# Patient Record
Sex: FEMALE | Race: OTHER | HISPANIC OR LATINO | URBAN - METROPOLITAN AREA
[De-identification: names, ages, dates, MRNs, and addresses within clinical notes are randomized per-mention and may not be internally consistent; named-entity substitution may affect disease eponyms.]

---

## 2017-07-11 ENCOUNTER — EMERGENCY (EMERGENCY)
Facility: HOSPITAL | Age: 27
LOS: 1 days | Discharge: HOME | End: 2017-07-11
Attending: EMERGENCY MEDICINE | Admitting: EMERGENCY MEDICINE
Payer: MEDICAID

## 2017-07-11 VITALS
TEMPERATURE: 98 F | OXYGEN SATURATION: 100 % | SYSTOLIC BLOOD PRESSURE: 98 MMHG | DIASTOLIC BLOOD PRESSURE: 60 MMHG | HEART RATE: 72 BPM | RESPIRATION RATE: 16 BRPM

## 2017-07-11 VITALS
SYSTOLIC BLOOD PRESSURE: 111 MMHG | TEMPERATURE: 98 F | OXYGEN SATURATION: 99 % | WEIGHT: 130.07 LBS | RESPIRATION RATE: 18 BRPM | HEART RATE: 90 BPM | HEIGHT: 64 IN | DIASTOLIC BLOOD PRESSURE: 72 MMHG

## 2017-07-11 LAB
ALBUMIN SERPL ELPH-MCNC: 4.6 G/DL — SIGNIFICANT CHANGE UP (ref 3.3–5)
ALP SERPL-CCNC: 60 U/L — SIGNIFICANT CHANGE UP (ref 40–120)
ALT FLD-CCNC: 30 U/L — SIGNIFICANT CHANGE UP (ref 4–33)
AMYLASE P1 CFR SERPL: 73 U/L — SIGNIFICANT CHANGE UP (ref 25–125)
APPEARANCE UR: CLEAR — SIGNIFICANT CHANGE UP
AST SERPL-CCNC: 18 U/L — SIGNIFICANT CHANGE UP (ref 4–32)
BASE EXCESS BLDV CALC-SCNC: 3 MMOL/L — SIGNIFICANT CHANGE UP
BASOPHILS # BLD AUTO: 0.02 K/UL — SIGNIFICANT CHANGE UP (ref 0–0.2)
BASOPHILS NFR BLD AUTO: 0.2 % — SIGNIFICANT CHANGE UP (ref 0–2)
BILIRUB SERPL-MCNC: < 0.2 MG/DL — LOW (ref 0.2–1.2)
BILIRUB UR-MCNC: NEGATIVE — SIGNIFICANT CHANGE UP
BLOOD GAS VENOUS - CREATININE: 0.64 MG/DL — SIGNIFICANT CHANGE UP (ref 0.5–1.3)
BLOOD UR QL VISUAL: HIGH
BUN SERPL-MCNC: 6 MG/DL — LOW (ref 7–23)
CALCIUM SERPL-MCNC: 9.2 MG/DL — SIGNIFICANT CHANGE UP (ref 8.4–10.5)
CHLORIDE BLDV-SCNC: 107 MMOL/L — SIGNIFICANT CHANGE UP (ref 96–108)
CHLORIDE SERPL-SCNC: 102 MMOL/L — SIGNIFICANT CHANGE UP (ref 98–107)
CO2 SERPL-SCNC: 25 MMOL/L — SIGNIFICANT CHANGE UP (ref 22–31)
COLOR SPEC: YELLOW — SIGNIFICANT CHANGE UP
CREAT SERPL-MCNC: 0.71 MG/DL — SIGNIFICANT CHANGE UP (ref 0.5–1.3)
EOSINOPHIL # BLD AUTO: 0.06 K/UL — SIGNIFICANT CHANGE UP (ref 0–0.5)
EOSINOPHIL NFR BLD AUTO: 0.7 % — SIGNIFICANT CHANGE UP (ref 0–6)
GAS PNL BLDV: 134 MMOL/L — LOW (ref 136–146)
GLUCOSE BLDV-MCNC: 86 — SIGNIFICANT CHANGE UP (ref 70–99)
GLUCOSE SERPL-MCNC: 87 MG/DL — SIGNIFICANT CHANGE UP (ref 70–99)
GLUCOSE UR-MCNC: NEGATIVE — SIGNIFICANT CHANGE UP
HCO3 BLDV-SCNC: 24 MMOL/L — SIGNIFICANT CHANGE UP (ref 20–27)
HCT VFR BLD CALC: 40.2 % — SIGNIFICANT CHANGE UP (ref 34.5–45)
HCT VFR BLDV CALC: 41 % — SIGNIFICANT CHANGE UP (ref 34.5–45)
HGB BLD-MCNC: 12.9 G/DL — SIGNIFICANT CHANGE UP (ref 11.5–15.5)
HGB BLDV-MCNC: 13.3 G/DL — SIGNIFICANT CHANGE UP (ref 11.5–15.5)
IMM GRANULOCYTES # BLD AUTO: 0.01 # — SIGNIFICANT CHANGE UP
IMM GRANULOCYTES NFR BLD AUTO: 0.1 % — SIGNIFICANT CHANGE UP (ref 0–1.5)
KETONES UR-MCNC: NEGATIVE — SIGNIFICANT CHANGE UP
LACTATE BLDV-MCNC: 1.6 MMOL/L — SIGNIFICANT CHANGE UP (ref 0.5–2)
LEUKOCYTE ESTERASE UR-ACNC: NEGATIVE — SIGNIFICANT CHANGE UP
LIDOCAIN IGE QN: 40.8 U/L — SIGNIFICANT CHANGE UP (ref 7–60)
LYMPHOCYTES # BLD AUTO: 1.74 K/UL — SIGNIFICANT CHANGE UP (ref 1–3.3)
LYMPHOCYTES # BLD AUTO: 21.1 % — SIGNIFICANT CHANGE UP (ref 13–44)
MCHC RBC-ENTMCNC: 25.3 PG — LOW (ref 27–34)
MCHC RBC-ENTMCNC: 32.1 % — SIGNIFICANT CHANGE UP (ref 32–36)
MCV RBC AUTO: 78.8 FL — LOW (ref 80–100)
MONOCYTES # BLD AUTO: 0.72 K/UL — SIGNIFICANT CHANGE UP (ref 0–0.9)
MONOCYTES NFR BLD AUTO: 8.7 % — SIGNIFICANT CHANGE UP (ref 2–14)
MUCOUS THREADS # UR AUTO: SIGNIFICANT CHANGE UP
NEUTROPHILS # BLD AUTO: 5.68 K/UL — SIGNIFICANT CHANGE UP (ref 1.8–7.4)
NEUTROPHILS NFR BLD AUTO: 69.2 % — SIGNIFICANT CHANGE UP (ref 43–77)
NITRITE UR-MCNC: NEGATIVE — SIGNIFICANT CHANGE UP
NON-SQ EPI CELLS # UR AUTO: <1 — SIGNIFICANT CHANGE UP
NRBC # FLD: 0 — SIGNIFICANT CHANGE UP
PCO2 BLDV: 57 MMHG — HIGH (ref 41–51)
PH BLDV: 7.32 PH — SIGNIFICANT CHANGE UP (ref 7.32–7.43)
PH UR: 6 — SIGNIFICANT CHANGE UP (ref 5–8)
PLATELET # BLD AUTO: 225 K/UL — SIGNIFICANT CHANGE UP (ref 150–400)
PMV BLD: 11.2 FL — SIGNIFICANT CHANGE UP (ref 7–13)
PO2 BLDV: < 24 MMHG — LOW (ref 35–40)
POTASSIUM BLDV-SCNC: 3.4 MMOL/L — SIGNIFICANT CHANGE UP (ref 3.4–4.5)
POTASSIUM SERPL-MCNC: 3.6 MMOL/L — SIGNIFICANT CHANGE UP (ref 3.5–5.3)
POTASSIUM SERPL-SCNC: 3.6 MMOL/L — SIGNIFICANT CHANGE UP (ref 3.5–5.3)
PROT SERPL-MCNC: 8.3 G/DL — SIGNIFICANT CHANGE UP (ref 6–8.3)
PROT UR-MCNC: 30 — HIGH
RBC # BLD: 5.1 M/UL — SIGNIFICANT CHANGE UP (ref 3.8–5.2)
RBC # FLD: 13.5 % — SIGNIFICANT CHANGE UP (ref 10.3–14.5)
RBC CASTS # UR COMP ASSIST: SIGNIFICANT CHANGE UP (ref 0–?)
SAO2 % BLDV: 16.6 % — LOW (ref 60–85)
SODIUM SERPL-SCNC: 142 MMOL/L — SIGNIFICANT CHANGE UP (ref 135–145)
SP GR SPEC: 1.01 — SIGNIFICANT CHANGE UP (ref 1–1.03)
SQUAMOUS # UR AUTO: SIGNIFICANT CHANGE UP
UROBILINOGEN FLD QL: 0.2 E.U. — SIGNIFICANT CHANGE UP (ref 0.2–1)
WBC # BLD: 8.23 K/UL — SIGNIFICANT CHANGE UP (ref 3.8–10.5)
WBC # FLD AUTO: 8.23 K/UL — SIGNIFICANT CHANGE UP (ref 3.8–10.5)
WBC UR QL: HIGH (ref 0–?)

## 2017-07-11 PROCEDURE — 99284 EMERGENCY DEPT VISIT MOD MDM: CPT

## 2017-07-11 PROCEDURE — 74177 CT ABD & PELVIS W/CONTRAST: CPT | Mod: 26

## 2017-07-11 RX ORDER — FAMOTIDINE 10 MG/ML
20 INJECTION INTRAVENOUS ONCE
Qty: 0 | Refills: 0 | Status: COMPLETED | OUTPATIENT
Start: 2017-07-11 | End: 2017-07-11

## 2017-07-11 RX ORDER — SODIUM CHLORIDE 9 MG/ML
1000 INJECTION INTRAMUSCULAR; INTRAVENOUS; SUBCUTANEOUS ONCE
Qty: 0 | Refills: 0 | Status: COMPLETED | OUTPATIENT
Start: 2017-07-11 | End: 2017-07-11

## 2017-07-11 RX ORDER — ONDANSETRON 8 MG/1
4 TABLET, FILM COATED ORAL ONCE
Qty: 0 | Refills: 0 | Status: COMPLETED | OUTPATIENT
Start: 2017-07-11 | End: 2017-07-11

## 2017-07-11 RX ADMIN — FAMOTIDINE 20 MILLIGRAM(S): 10 INJECTION INTRAVENOUS at 17:34

## 2017-07-11 RX ADMIN — ONDANSETRON 4 MILLIGRAM(S): 8 TABLET, FILM COATED ORAL at 17:55

## 2017-07-11 RX ADMIN — SODIUM CHLORIDE 1000 MILLILITER(S): 9 INJECTION INTRAMUSCULAR; INTRAVENOUS; SUBCUTANEOUS at 17:31

## 2017-07-11 NOTE — ED PROVIDER NOTE - OBJECTIVE STATEMENT
26 y/o female w/ hx of undiagnosed abdominal pain (years) p/w abdominal discomfort. Patient was having nausea / vomiting and diarrhea for 1 week with and lower abdominal pain. Described as cramping.  had similar symptoms. Subjective fever. Took imodium earlier today w/ resolution of symptoms. No urinary complaints. LMP 6/20/17    Follows GI DR NNEKA FLOOD 26 y/o female w/ hx of undiagnosed abdominal pain (years) p/w abdominal discomfort. Patient was having nausea / vomiting and diarrhea for 1 week with and lower abdominal pain. Described as cramping.  had similar symptoms. Subjective fever. Took imodium earlier today w/ resolution of symptoms. No urinary complaints. LMP 6/20/17  Pt with recent camping trip in woods and had been eating hamburgers out of a cooler and cooking on own.      Follows GI DR NNEKA FLOOD

## 2017-07-11 NOTE — ED ADULT TRIAGE NOTE - CHIEF COMPLAINT QUOTE
pt reports having colonscopy and endoscopy x 1 week ago with fevers and chills s/p procedure, pt reports she then developed stomach flu was treated by GI, given antibiotics and pantoprazol. pt reports she then went camping over the weekend and developed diarrhea every 15 minutes with worsening abdominal pain, and bloody stools now with abdominal cramping and nausea worse with laying down. pt also endorses lightheadedness and dehydration, decreased PO intake. pt reports taking imodium with minimal relief. PMHX: possible IBS, gastric ulcer

## 2017-07-11 NOTE — ED PROVIDER NOTE - PLAN OF CARE
pls rest, drink plenty of warm fluids, motrin 600mg every 8 hours, f/u with your obgyn, return for any worsening symptoms or any other concerning symptoms

## 2017-07-11 NOTE — ED PROVIDER NOTE - ATTENDING CONTRIBUTION TO CARE
I performed a face to face evaluation of this patient and performed a full history and physical examination on the patient.  I agree with the resident's history, physical examination, and plan of the patient.  Pt with abd pain, n/v/d, recent camping trip no fevers.  Has been occurring x days. Now with abd pain, mild diffuse ttp, no rebound.  No cvat, labs, ua, CT

## 2017-07-11 NOTE — ED PROVIDER NOTE - CARE PLAN
Principal Discharge DX:	Colitis Principal Discharge DX:	Ruptured ovarian cyst  Instructions for follow-up, activity and diet:	pls rest, drink plenty of warm fluids, motrin 600mg every 8 hours, f/u with your obgyn, return for any worsening symptoms or any other concerning symptoms

## 2017-07-11 NOTE — ED ADULT NURSE REASSESSMENT NOTE - NS ED NURSE REASSESS COMMENT FT1
Pt awake and alert afebrile co abdominal pain. Pt denies nausea or vomiting but asking to eat. Pt told to wait for attending evaluation. pt states pain 9/10. iv placed labs drawn pt awaiting evaluation.

## 2017-07-11 NOTE — ED PROVIDER NOTE - PROGRESS NOTE DETAILS
ANTIONETTE RACHEL: pt examined at bedside, mild suprapubic tenderness, states that she is feeling mostly better, will d/c with instuctions to take motrin for pain and return for any worsening symptoms or any other concerning symptoms

## 2017-07-13 ENCOUNTER — TRANSCRIPTION ENCOUNTER (OUTPATIENT)
Age: 27
End: 2017-07-13

## 2020-06-05 ENCOUNTER — TRANSCRIPTION ENCOUNTER (OUTPATIENT)
Age: 30
End: 2020-06-05

## 2020-11-18 ENCOUNTER — APPOINTMENT (OUTPATIENT)
Dept: INTERNAL MEDICINE | Facility: CLINIC | Age: 30
End: 2020-11-18
Payer: MEDICAID

## 2020-11-18 VITALS
DIASTOLIC BLOOD PRESSURE: 70 MMHG | HEIGHT: 64 IN | RESPIRATION RATE: 14 BRPM | BODY MASS INDEX: 26.63 KG/M2 | OXYGEN SATURATION: 98 % | WEIGHT: 156 LBS | TEMPERATURE: 98.6 F | SYSTOLIC BLOOD PRESSURE: 100 MMHG | HEART RATE: 68 BPM

## 2020-11-18 DIAGNOSIS — H81.10 BENIGN PAROXYSMAL VERTIGO, UNSPECIFIED EAR: ICD-10-CM

## 2020-11-18 DIAGNOSIS — Z83.49 FAMILY HISTORY OF OTHER ENDOCRINE, NUTRITIONAL AND METABOLIC DISEASES: ICD-10-CM

## 2020-11-18 DIAGNOSIS — B96.89 ACUTE VAGINITIS: ICD-10-CM

## 2020-11-18 DIAGNOSIS — N76.0 ACUTE VAGINITIS: ICD-10-CM

## 2020-11-18 DIAGNOSIS — Z78.9 OTHER SPECIFIED HEALTH STATUS: ICD-10-CM

## 2020-11-18 DIAGNOSIS — Z83.3 FAMILY HISTORY OF DIABETES MELLITUS: ICD-10-CM

## 2020-11-18 DIAGNOSIS — Z82.5 FAMILY HISTORY OF ASTHMA AND OTHER CHRONIC LOWER RESPIRATORY DISEASES: ICD-10-CM

## 2020-11-18 PROCEDURE — 99385 PREV VISIT NEW AGE 18-39: CPT | Mod: 25

## 2020-11-18 PROCEDURE — 36415 COLL VENOUS BLD VENIPUNCTURE: CPT

## 2020-11-18 PROCEDURE — G0444 DEPRESSION SCREEN ANNUAL: CPT

## 2020-11-18 RX ORDER — ZINC OXIDE 13 %
CREAM (GRAM) TOPICAL
Refills: 0 | Status: ACTIVE | COMMUNITY

## 2020-11-18 NOTE — ASSESSMENT
[FreeTextEntry1] : \par hx IBS- diarrhea predominant, lactose intolerance- controlled\par -followed by GI, Dr. Pereira- last seen 9/20 with Rx taper discussed, has f/u 12/20\par -on Amitriptyline 25mg qd, taking x 2 yrs with good control- slowly tapering as wants to be off when tries to conceive next year.\par -avoids lactose-containing foods\par -on probiotic\par -asked to forward record\par \par hx seasonal allergies- controlled \par -on flonase\par \par overweight- recent intentional wt loss, congratulated!\par -healthy eating, exercise and wt loss counseled\par \par \par MISC:  Continued social distancing and measure for covid19 prevention encouraged.  \par -hx negative covid AB 10/20, wants repeat\par \par \par HCM\par -check screening labs; agreeable to STD/HIV screening\par -advised to start prenatal MVI if planning pregnancy\par -hx flu shot 10/20\par -hx Tdap 8/17\par -hx hep A and B series\par -hx HPV series\par -vaccine record copy to be scanned into chart\par -hx negative PAP 2017, GYN referral given for f/u \par -derm referral for screening.  Regular use of sun block for skin cancer prevention advised.\par -optho referral for screening per request\par -yearly dental screening advised\par \par \par Pt's cell: [phone]

## 2020-11-18 NOTE — PAST MEDICAL HISTORY
[Definite ___ (Date)] : the last menstrual period was [unfilled] [Regular Cycle Intervals] : have been regular [Dysmenorrhea] : dysmenorrhea [Total Preg ___] : G[unfilled]

## 2020-11-18 NOTE — HEALTH RISK ASSESSMENT
[Patient reported PAP Smear was normal] : Patient reported PAP Smear was normal [HIV Test offered] : HIV Test offered [Smoke Detector] : smoke detector [Carbon Monoxide Detector] : carbon monoxide detector [Seat Belt] :  uses seat belt [Sunscreen] : uses sunscreen [0] : 2) Feeling down, depressed, or hopeless: Not at all (0) [ACS0Bmdpy] : 0 [Guns at Home] : no guns at home [PapSmearDate] : 01/17

## 2020-11-18 NOTE — HISTORY OF PRESENT ILLNESS
[Health Maintenance] : health maintenance [___ Year(s) Ago] : [unfilled] year(s) ago [Spouse] : spouse [] :  [Working Full Time] : working full time [Occupation ___] : occupation: [unfilled] [Never Smoked Cigarettes] : has never smoked cigarettes [Rare Use] : rare alcohol use [Never] : has never used illicit drugs [Premenopausal] : the patient is premenopausal [Good] : good [Reg. Dental Visits] : She has regular dental visits [FreeTextEntry1] : Needs new PMD [Binge Drinking] : denies binge drinking [Patient Concern] : no personal concern about alcohol use [Family Concern] : no family concern about alcohol use [Vision Problems] : She denies vision problems [Hearing Loss] : She denies hearing loss [de-identified] : hx flu shot 10/20, hx Tdap 8/17, hx hep A/B and HPV series [de-identified] : \par Feeling well.\par Last ate 2.5 hrs ago- had overnight oatmeal, wants labs today\par \par Reports is socially distancing and using precautions for covid prevention. Using PPE at work.\par Denies sick or covid positive contacts.\par Denies fever, chills, cough or sob.\par -hx negative covid AB 10/20, wants repeat\par \par hx IBS- diarrhea predominant, lactose intolerance\par -followed by GI, Dr. Pereira- last seen 9/20 with Rx taper discussed, has f/u 12/20\par -on Amitriptyline 25mg qd, taking x 2 yrs with good control- slowly tapering as wants to be off when tries to conceive next year.\par -avoid lactose-containing foods\par -on probiotic\par \par Reports nl appetite and BMs.\par Denies GI complaints.\par Reports lost ~ 10 lbs in past year- eating healthier, less portions/carbs, more veggies.  \par Exercise: runs 2x/wk x 20 mins; mild-mod wt lifting 3-4x/wk\par \par hx seasonal allergies- controlled on flonase\par -denies hx asthma or chronic lung dx.\par \par Sexually active with , no contraception as open to pregnancy; denies hx STD dx\par -denies  complaints currently\par -hx recurrent BV (hx oral and suppository tx), last seen by GYN 3 yrs ago

## 2020-11-18 NOTE — PHYSICAL EXAM
[No Acute Distress] : no acute distress [Well Developed] : well developed [Normal Sclera/Conjunctiva] : normal sclera/conjunctiva [PERRL] : pupils equal round and reactive to light [EOMI] : extraocular movements intact [Normal Outer Ear/Nose] : the outer ears and nose were normal in appearance [Normal Oropharynx] : the oropharynx was normal [Normal TMs] : both tympanic membranes were normal [Normal Nasal Mucosa] : the nasal mucosa was normal [No Lymphadenopathy] : no lymphadenopathy [Supple] : supple [Thyroid Normal, No Nodules] : the thyroid was normal and there were no nodules present [No Respiratory Distress] : no respiratory distress  [Clear to Auscultation] : lungs were clear to auscultation bilaterally [Normal Rate] : normal rate  [Regular Rhythm] : with a regular rhythm [Normal S1, S2] : normal S1 and S2 [No Murmur] : no murmur heard [Pedal Pulses Present] : the pedal pulses are present [No Edema] : there was no peripheral edema [Soft] : abdomen soft [Non Tender] : non-tender [No HSM] : no HSM [Normal Supraclavicular Nodes] : no supraclavicular lymphadenopathy [Normal Posterior Cervical Nodes] : no posterior cervical lymphadenopathy [Normal Anterior Cervical Nodes] : no anterior cervical lymphadenopathy [No CVA Tenderness] : no CVA  tenderness [No Spinal Tenderness] : no spinal tenderness [No Joint Swelling] : no joint swelling [Grossly Normal Strength/Tone] : grossly normal strength/tone [No Rash] : no rash [No Focal Deficits] : no focal deficits [Normal Gait] : normal gait [Normal Affect] : the affect was normal [Alert and Oriented x3] : oriented to person, place, and time

## 2020-11-23 LAB
25(OH)D3 SERPL-MCNC: 30 NG/ML
ALBUMIN SERPL ELPH-MCNC: 5.4 G/DL
ALP BLD-CCNC: 80 U/L
ALT SERPL-CCNC: 17 U/L
ANION GAP SERPL CALC-SCNC: 11 MMOL/L
AST SERPL-CCNC: 22 U/L
BASOPHILS # BLD AUTO: 0.04 K/UL
BASOPHILS NFR BLD AUTO: 0.5 %
BILIRUB SERPL-MCNC: 0.2 MG/DL
BUN SERPL-MCNC: 9 MG/DL
C TRACH RRNA SPEC QL NAA+PROBE: NOT DETECTED
CALCIUM SERPL-MCNC: 10.8 MG/DL
CHLORIDE SERPL-SCNC: 100 MMOL/L
CHOLEST SERPL-MCNC: 225 MG/DL
CO2 SERPL-SCNC: 27 MMOL/L
CREAT SERPL-MCNC: 0.79 MG/DL
EOSINOPHIL # BLD AUTO: 0.06 K/UL
EOSINOPHIL NFR BLD AUTO: 0.8 %
ESTIMATED AVERAGE GLUCOSE: 108 MG/DL
GLUCOSE SERPL-MCNC: 91 MG/DL
HBA1C MFR BLD HPLC: 5.4 %
HBV CORE IGG+IGM SER QL: NONREACTIVE
HBV SURFACE AB SER QL: REACTIVE
HBV SURFACE AG SER QL: NONREACTIVE
HCT VFR BLD CALC: 45.9 %
HCV AB SER QL: NONREACTIVE
HCV S/CO RATIO: 0.06 S/CO
HDLC SERPL-MCNC: 66 MG/DL
HGB BLD-MCNC: 13.6 G/DL
HIV1+2 AB SPEC QL IA.RAPID: NONREACTIVE
IMM GRANULOCYTES NFR BLD AUTO: 0.3 %
LDLC SERPL CALC-MCNC: 133 MG/DL
LYMPHOCYTES # BLD AUTO: 2.21 K/UL
LYMPHOCYTES NFR BLD AUTO: 29.3 %
MAN DIFF?: NORMAL
MCHC RBC-ENTMCNC: 24.7 PG
MCHC RBC-ENTMCNC: 29.6 GM/DL
MCV RBC AUTO: 83.3 FL
MONOCYTES # BLD AUTO: 0.42 K/UL
MONOCYTES NFR BLD AUTO: 5.6 %
N GONORRHOEA RRNA SPEC QL NAA+PROBE: NOT DETECTED
NEUTROPHILS # BLD AUTO: 4.78 K/UL
NEUTROPHILS NFR BLD AUTO: 63.5 %
NONHDLC SERPL-MCNC: 159 MG/DL
PLATELET # BLD AUTO: 272 K/UL
POTASSIUM SERPL-SCNC: 4.4 MMOL/L
PROT SERPL-MCNC: 8.4 G/DL
RBC # BLD: 5.51 M/UL
RBC # FLD: 14.2 %
SARS-COV-2 IGG SERPL IA-ACNC: 0.1 INDEX
SARS-COV-2 IGG SERPL QL IA: NEGATIVE
SODIUM SERPL-SCNC: 138 MMOL/L
SOURCE AMPLIFICATION: NORMAL
T PALLIDUM AB SER QL IA: NEGATIVE
TRIGL SERPL-MCNC: 129 MG/DL
TSH SERPL-ACNC: 1 UIU/ML
WBC # FLD AUTO: 7.53 K/UL

## 2020-12-24 LAB
ALBUMIN SERPL ELPH-MCNC: 5 G/DL
ALP BLD-CCNC: 64 U/L
ALT SERPL-CCNC: 11 U/L
ANION GAP SERPL CALC-SCNC: 10 MMOL/L
AST SERPL-CCNC: 12 U/L
BILIRUB SERPL-MCNC: 0.2 MG/DL
BUN SERPL-MCNC: 11 MG/DL
CALCIUM SERPL-MCNC: 9.9 MG/DL
CHLORIDE SERPL-SCNC: 103 MMOL/L
CO2 SERPL-SCNC: 26 MMOL/L
CREAT SERPL-MCNC: 0.93 MG/DL
GLUCOSE SERPL-MCNC: 103 MG/DL
POTASSIUM SERPL-SCNC: 4.5 MMOL/L
PROT SERPL-MCNC: 7.5 G/DL
SODIUM SERPL-SCNC: 139 MMOL/L

## 2021-03-09 DIAGNOSIS — Z11.59 ENCOUNTER FOR SCREENING FOR OTHER VIRAL DISEASES: ICD-10-CM

## 2021-03-09 PROBLEM — R77.8 ELEVATED TOTAL PROTEIN: Status: RESOLVED | Noted: 2020-11-23 | Resolved: 2021-03-09

## 2021-03-09 PROBLEM — Z86.39 HISTORY OF HYPERCALCEMIA: Status: RESOLVED | Noted: 2020-11-23 | Resolved: 2021-03-09

## 2021-03-10 ENCOUNTER — APPOINTMENT (OUTPATIENT)
Dept: INTERNAL MEDICINE | Facility: CLINIC | Age: 31
End: 2021-03-10

## 2021-03-10 DIAGNOSIS — R77.8 OTHER SPECIFIED ABNORMALITIES OF PLASMA PROTEINS: ICD-10-CM

## 2021-03-10 DIAGNOSIS — Z86.39 PERSONAL HISTORY OF OTHER ENDOCRINE, NUTRITIONAL AND METABOLIC DISEASE: ICD-10-CM

## 2021-03-16 ENCOUNTER — APPOINTMENT (OUTPATIENT)
Dept: INTERNAL MEDICINE | Facility: CLINIC | Age: 31
End: 2021-03-16
Payer: MEDICAID

## 2021-03-16 ENCOUNTER — NON-APPOINTMENT (OUTPATIENT)
Age: 31
End: 2021-03-16

## 2021-03-16 VITALS
BODY MASS INDEX: 25.61 KG/M2 | SYSTOLIC BLOOD PRESSURE: 110 MMHG | WEIGHT: 150 LBS | OXYGEN SATURATION: 98 % | DIASTOLIC BLOOD PRESSURE: 66 MMHG | HEIGHT: 64 IN | HEART RATE: 81 BPM | TEMPERATURE: 98.2 F | RESPIRATION RATE: 14 BRPM

## 2021-03-16 LAB
ALBUMIN SERPL ELPH-MCNC: 4.6 G/DL
ALP BLD-CCNC: 63 U/L
ALT SERPL-CCNC: 18 U/L
ANION GAP SERPL CALC-SCNC: 11 MMOL/L
AST SERPL-CCNC: 20 U/L
BASOPHILS # BLD AUTO: 0.04 K/UL
BASOPHILS NFR BLD AUTO: 0.5 %
BILIRUB SERPL-MCNC: 0.2 MG/DL
BUN SERPL-MCNC: 9 MG/DL
CALCIUM SERPL-MCNC: 9.9 MG/DL
CHLORIDE SERPL-SCNC: 103 MMOL/L
CO2 SERPL-SCNC: 27 MMOL/L
CREAT SERPL-MCNC: 0.81 MG/DL
EOSINOPHIL # BLD AUTO: 0.05 K/UL
EOSINOPHIL NFR BLD AUTO: 0.6 %
GLUCOSE SERPL-MCNC: 89 MG/DL
HCT VFR BLD CALC: 40.5 %
HGB BLD-MCNC: 12.5 G/DL
IMM GRANULOCYTES NFR BLD AUTO: 0.1 %
LPL SERPL-CCNC: 34 U/L
LYMPHOCYTES # BLD AUTO: 1.92 K/UL
LYMPHOCYTES NFR BLD AUTO: 23.8 %
MAN DIFF?: NORMAL
MCHC RBC-ENTMCNC: 25.4 PG
MCHC RBC-ENTMCNC: 30.9 GM/DL
MCV RBC AUTO: 82.2 FL
MONOCYTES # BLD AUTO: 0.5 K/UL
MONOCYTES NFR BLD AUTO: 6.2 %
NEUTROPHILS # BLD AUTO: 5.55 K/UL
NEUTROPHILS NFR BLD AUTO: 68.8 %
PLATELET # BLD AUTO: 209 K/UL
POTASSIUM SERPL-SCNC: 4.1 MMOL/L
PROT SERPL-MCNC: 7.3 G/DL
RBC # BLD: 4.93 M/UL
RBC # FLD: 14.4 %
SODIUM SERPL-SCNC: 140 MMOL/L
TSH SERPL-ACNC: 0.6 UIU/ML
WBC # FLD AUTO: 8.07 K/UL

## 2021-03-16 PROCEDURE — 99214 OFFICE O/P EST MOD 30 MIN: CPT | Mod: 25

## 2021-03-16 PROCEDURE — 99072 ADDL SUPL MATRL&STAF TM PHE: CPT

## 2021-03-16 PROCEDURE — 93000 ELECTROCARDIOGRAM COMPLETE: CPT

## 2021-03-16 RX ORDER — AMITRIPTYLINE HYDROCHLORIDE 25 MG/1
25 TABLET, FILM COATED ORAL DAILY
Refills: 0 | Status: DISCONTINUED | COMMUNITY
End: 2021-03-16

## 2021-03-16 NOTE — HISTORY OF PRESENT ILLNESS
[FreeTextEntry8] : \par 31 yo F pmhx IBS, seasonal allergies, overweight for acute visit\par \par Reports is socially distancing and using precautions for covid prevention. Using PPE at work.\par Denies fever, chills, cough or sob.\par -hx negative covid AB 11/20\par -hx covid (moderna) vaccine- finished 2/21- notes had cough x 1 week after, saw local UC for eval when also noted some chest tenderness on exam, told lungs clear, no cxr done.  Given cough syrup- since near resolved.\par \par c/o CP x 1 week \par -CP:  on/off , mid chest, dull or sharp, mainly at night, especially when laying and gets palpitations with it- overall gets less with sitting upright\par -states had episode 2d ago a/w dizziness and nausea - resolved with drinking water, no recurrence since\par -denies relation of sx's to exertion; denies radiation of CP, sob, leg swelling or LOC\par -unsure if has reflux, notes frequent +belching recently.  Also having IBS related stomach aching on/off a/w loss of appetite and constipation recently.  Notes has been taking [employer] (with ASA in it) for 2 weeks due to stomach upset as thought would help\par -has soft BM daily, but finds is straining.  No BRBRP or melena\par -abdominal pain: mid abdomen, aching, on/off, no relation to eating food or having BM\par -is taking OTC Prilosec since yesterday- notes helps sx's and as result slept better since\par -denies throat discomfort or dysphagia\par \par Denies recent sick contacts or major diet changes, eating healthy.  Exercise: runs on treadmill 2-3x/wk x 20 mins,occasional wt lifting (last 2 weeks ago) - doing on/off x 1 yr, done w/o sx's or limitations\par Avoids spicy food.  +acidic/tomato based foods on/off.  + coffee daily 1x/d for long time\par No recent NSAIDs.  Occasional ETOH- last 2d ago, 2 glasses of wine.\par \par hx IBS, lactose intolerance-\par -followed by GI, Dr. Pereira, states last seen few months ago with amitriptyline taper planned as tolerated and advised Citrucel qhs.  F/U appt pending.\par -reports d/c'd amitriptyline, off x 2 mo now\par -on Citrucel qhs prn\par -on probiotic\par \par \par Denies  complaints.\par

## 2021-03-16 NOTE — PHYSICAL EXAM
[No Acute Distress] : no acute distress [Well-Appearing] : well-appearing [Normal Sclera/Conjunctiva] : normal sclera/conjunctiva [PERRL] : pupils equal round and reactive to light [EOMI] : extraocular movements intact [Normal Outer Ear/Nose] : the outer ears and nose were normal in appearance [Normal Oropharynx] : the oropharynx was normal [Normal Nasal Mucosa] : the nasal mucosa was normal [No Lymphadenopathy] : no lymphadenopathy [Supple] : supple [Thyroid Normal, No Nodules] : the thyroid was normal and there were no nodules present [No Respiratory Distress] : no respiratory distress  [Clear to Auscultation] : lungs were clear to auscultation bilaterally [Normal Rate] : normal rate  [Regular Rhythm] : with a regular rhythm [Normal S1, S2] : normal S1 and S2 [No Murmur] : no murmur heard [Pedal Pulses Present] : the pedal pulses are present [No Edema] : there was no peripheral edema [Soft] : abdomen soft [Non-distended] : non-distended [No Masses] : no abdominal mass palpated [No HSM] : no HSM [Normal Supraclavicular Nodes] : no supraclavicular lymphadenopathy [Normal Posterior Cervical Nodes] : no posterior cervical lymphadenopathy [Normal Anterior Cervical Nodes] : no anterior cervical lymphadenopathy [No CVA Tenderness] : no CVA  tenderness [No Spinal Tenderness] : no spinal tenderness [No Joint Swelling] : no joint swelling [No Rash] : no rash [No Focal Deficits] : no focal deficits [Normal Gait] : normal gait [Normal Affect] : the affect was normal [Alert and Oriented x3] : oriented to person, place, and time [de-identified] : diffuse mid chest tenderness, no rash [de-identified] : mild epigastric discomfort with mild palpation, no rebound/guarding

## 2021-03-16 NOTE — ASSESSMENT
[FreeTextEntry1] : \par 31 yo F pmhx IBS, seasonal allergies, overweight for acute visit\par \par atypical CP a/w palpitations, hx transient dizziness, recent IBS flare a/w low appetite- suspect MSK etiology of CP +/- GI etiology;  Neuro exam wnl today.\par -EKG: NSR @ 67 bmp, ? LAE no acute pathology\par -check labs cbc/cmp/lipase\par -cont OTC prilosec, to take on empty stomach\par -advised to avoid reflux aggravating foods and NSAIDs/ASA (to d/c Lesli)\par -avoid meals near bedtime, elevate HOB\par -advised BRAT diet and increased hydration\par -advised Tylenol prn for reproducible CP and to avoid heavy lifting\par -cardio referral for eval\par -GI f/u advised\par -advised prompt medical eval if sx's worsen or new sx's arise\par \par hx IBS, lactose intolerance- \par -followed by GI, Dr. Pereira, f/u pending- encouraged\par -hx Amitriptyline 25mg qd, hx slowly tapering off (d/c'd 1/21) as wants to be off when tries to conceive next year.\par -avoids lactose-containing foods\par -on probiotic\par -asked to forward record\par \par \par MISC: Continued social distancing and measure for covid19 prevention encouraged. \par -hx negative covid AB 10/20\par -hx covid (moderna) vaccine- finished 2/21\par \par HCM\par -hx CPE 11/20\par -11/20 cbc/TSH/A1c/vit d and STD screening unremarkable\par -12/20 cmp wnl\par -hx flu shot 10/20\par -hx Tdap 8/17\par -hx hep A series\par -hx hep B series, immune per 11/20 labs\par -hx HPV series\par -hx negative PAP 2017, GYN referral given for f/u \par -derm referral for screening- pending. Regular use of sun block for skin cancer prevention advised.\par -optho referral for screening per request- pending\par \par Pt's cell: [phone]\par \par Labs drawn in office today.\par

## 2021-03-16 NOTE — REVIEW OF SYSTEMS
[Negative] : Psychiatric [FreeTextEntry5] : see HPI [FreeTextEntry6] : see HPI [FreeTextEntry7] : see HPI

## 2021-04-21 ENCOUNTER — APPOINTMENT (OUTPATIENT)
Dept: INTERNAL MEDICINE | Facility: CLINIC | Age: 31
End: 2021-04-21
Payer: MEDICAID

## 2021-04-21 VITALS
RESPIRATION RATE: 14 BRPM | SYSTOLIC BLOOD PRESSURE: 100 MMHG | DIASTOLIC BLOOD PRESSURE: 62 MMHG | BODY MASS INDEX: 24.89 KG/M2 | TEMPERATURE: 98.1 F | OXYGEN SATURATION: 98 % | HEART RATE: 80 BPM | WEIGHT: 145 LBS

## 2021-04-21 DIAGNOSIS — R14.2 ERUCTATION: ICD-10-CM

## 2021-04-21 DIAGNOSIS — Z87.19 PERSONAL HISTORY OF OTHER DISEASES OF THE DIGESTIVE SYSTEM: ICD-10-CM

## 2021-04-21 PROCEDURE — 99072 ADDL SUPL MATRL&STAF TM PHE: CPT

## 2021-04-21 PROCEDURE — 99213 OFFICE O/P EST LOW 20 MIN: CPT

## 2021-04-21 NOTE — PHYSICAL EXAM
[No Acute Distress] : no acute distress [Well-Appearing] : well-appearing [Normal Sclera/Conjunctiva] : normal sclera/conjunctiva [PERRL] : pupils equal round and reactive to light [EOMI] : extraocular movements intact [Normal Outer Ear/Nose] : the outer ears and nose were normal in appearance [Normal Oropharynx] : the oropharynx was normal [Normal Nasal Mucosa] : the nasal mucosa was normal [No Lymphadenopathy] : no lymphadenopathy [Supple] : supple [Thyroid Normal, No Nodules] : the thyroid was normal and there were no nodules present [No Respiratory Distress] : no respiratory distress  [Clear to Auscultation] : lungs were clear to auscultation bilaterally [Normal Rate] : normal rate  [Regular Rhythm] : with a regular rhythm [Normal S1, S2] : normal S1 and S2 [No Murmur] : no murmur heard [Pedal Pulses Present] : the pedal pulses are present [No Edema] : there was no peripheral edema [Soft] : abdomen soft [Non-distended] : non-distended [No Masses] : no abdominal mass palpated [No HSM] : no HSM [Normal Supraclavicular Nodes] : no supraclavicular lymphadenopathy [Normal Posterior Cervical Nodes] : no posterior cervical lymphadenopathy [Normal Anterior Cervical Nodes] : no anterior cervical lymphadenopathy [No CVA Tenderness] : no CVA  tenderness [No Spinal Tenderness] : no spinal tenderness [No Joint Swelling] : no joint swelling [No Rash] : no rash [No Focal Deficits] : no focal deficits [Normal Gait] : normal gait [Normal Affect] : the affect was normal [Alert and Oriented x3] : oriented to person, place, and time

## 2021-04-21 NOTE — HISTORY OF PRESENT ILLNESS
[FreeTextEntry1] : f/u [de-identified] : \par 29 yo F pmhx IBS, seasonal allergies, overweight for f/u\par \par Last seen in office 3/16/21 for acute visit- c/o atypical CP a/w palpitations, hx transient dizziness, hx recent IBS flare a/w low appetite- suspect MSK etiology of CP +/- GI etiology\par -labs done\par -EKG unremarkable\par -cardio referral given\par \par Today feeling well, no complaints.\par Does not need med refills.\par \par Reports is socially distancing and using precautions for covid prevention. Using PPE at work.\par Denies fever, chills, cough or sob.\par -hx negative covid AB 11/20\par -hx covid (moderna) vaccine- finished 2/21\par \par hx CP, palpitations, dizziness - reports resolved 1 week after last visit with consistent PPI use and diet changes for reflux aggravating food avoidance.  Remains resolved, taking PPI prn.\par -cardio eval pending, plans to see\par -GI f/u pending\par -hx  on/off , mid chest, dull or sharp, mainly at night, especially when laying and gets palpitations with it- overall gets less with sitting upright\par -states had episode 2d prior to CP onset a/w dizziness and nausea - resolved with drinking water, no recurrence since\par -denies relation of sx's to exertion; denies radiation of CP, sob, leg swelling or LOC\par -denies throat discomfort or dysphagia\par \par Reports intentionally losing wt with healthy diet and exercise\par Exercise: runs on treadmill 2-3x/wk x 20 mins, occasional wt lifting - doing on/off x 1 yr, done w/o sx's or limitations\par \par hx IBS, lactose intolerance- feels is controlled\par -followed by GI, Dr. Pereira, states last seen few months ago with amitriptyline taper planned as tolerated and advised Citrucel qhs.  F/U appt pending.\par -reports d/c'd amitriptyline, off x 3 mo \par -on Citrucel qhs prn\par -on probiotic\par \par Reports daily soft BMs, no BRBPR or melena.  Denies n/v or abd pain.\par \par \par Denies  complaints.\par -reports seen by GYN 3/21 for annual, also c/o vaginal d/c.  Had PAP and STD check- told all negative.  Told +BV and is s/p tx course (vaginally) with improvement.\par

## 2021-04-21 NOTE — ASSESSMENT
[FreeTextEntry1] : \par 29 yo F pmhx IBS, seasonal allergies, overweight for acute visit\par \par \par hx atypical CP a/w palpitations, hx transient dizziness, recent IBS flare a/w low appetite- suspect MSK etiology of CP +/- GI etiology.  Now resolved s/p PPI course and diet changes\par -3/21 EKG: NSR @ 67 bmp, ? LAE no acute pathology\par -3/21 cbc/cmp/lipase all unremarkable\par -cont OTC Prilosec prn, to take on empty stomach\par -advised to avoid reflux aggravating foods and NSAIDs/ASA \par -avoid meals near bedtime, elevate HOB\par -cardio referral for eval given prior, pending\par -GI f/u advised\par -advised prompt medical eval if sx's worsen or new sx's arise\par \par hx IBS, lactose intolerance- \par -followed by GI, Dr. Pereira, f/u pending- encouraged\par -hx Amitriptyline 25mg qd, hx slowly tapering off (d/c'd 1/21) as wants to be off when tries to conceive next year.\par -avoids lactose-containing foods\par -on probiotic\par -asked to forward record\par \par \par MISC: Continued social distancing and measure for covid19 prevention encouraged. \par -hx negative covid AB 10/20\par -hx covid (moderna) vaccine- finished 2/21\par \par \par HCM\par -hx CPE 11/20, yearly advised\par -11/20 cbc/TSH/A1c/vit d and STD screening unremarkable\par -12/20 cmp wnl\par -hx flu shot 10/20\par -hx Tdap 8/17\par -hx hep A series\par -hx hep B series, immune per 11/20 labs\par -hx HPV series\par -hx negative PAP 3/21 with GYN per pt\par -derm referral for screening- pending. Regular use of sun block for skin cancer prevention advised.\par -optho referral for screening per request- pending\par \par Pt's cell: [phone]\par Declines f/u appt, will call back.\par

## 2021-07-21 ENCOUNTER — APPOINTMENT (OUTPATIENT)
Dept: CARDIOLOGY | Facility: HOSPITAL | Age: 31
End: 2021-07-21

## 2021-07-21 VITALS
RESPIRATION RATE: 16 BRPM | SYSTOLIC BLOOD PRESSURE: 114 MMHG | OXYGEN SATURATION: 99 % | WEIGHT: 139 LBS | BODY MASS INDEX: 23.86 KG/M2 | TEMPERATURE: 97.8 F | HEART RATE: 80 BPM | DIASTOLIC BLOOD PRESSURE: 76 MMHG

## 2021-07-21 DIAGNOSIS — Z87.898 PERSONAL HISTORY OF OTHER SPECIFIED CONDITIONS: ICD-10-CM

## 2021-07-22 NOTE — ASSESSMENT
[FreeTextEntry1] : Ms Herron is a 30 yo female with a history of IBS who is referred from PCP for chest pain and palpitations.\par \par Chest pain: some features of symptoms are suggestive of pericarditis, though not evident by initial ECG 3/16/21\par - atypical, possibly some typical features, history is limited\par - will plan for exercise stress test and echo\par - will check esr/crp/trop to rule out pericarditis / myopericarditis given positional nature of her pain\par - if palpitations are persisting will get Holter monitor\par \par HLD\par - elevated LDL, given age and lack of risk factors will continue to recommend lifestyle intervention

## 2021-07-22 NOTE — REASON FOR VISIT
[Symptom and Test Evaluation] : symptom and test evaluation [FreeTextEntry1] : Ms Herron is a 32 yo female with a history of IBS who is sent from PCP clinic for chest pain and palpitations.\par \par Chest pain\par - since March 2021, starting after second COVID vaccine, did not respond to ppi, in different parts of her chest, pain usually lasting few minutes, at times has pressure on her chest, she is able to walk up three flights of stairs, and able to walk 4-5 blocks, not sure if this exacerbates her pain, she does recall one episode of chest pain after helping her brother move, pain gets to 8/10, she went to PCP because this pain alarmed her and it was at 10/10, feels better with leaning forward, worse when laying back\par \par Palpitations\par -  usually when she goes to bed, 3-4 x per week, drinks caffeine in the morning, has been improving, lasts 5 mins\par \par SHx\par - no smoking, rare social drinker, dental hygienist, feeling a lot

## 2021-07-22 NOTE — END OF VISIT
[FreeTextEntry3] : Ms Herron has history of IBS. She was referred from PCP for chest pain and palpitations, with some features of her chest pain suggestive of pericarditis, though not evident by initial ECG 3/16/21. Plans edited directly into above note.

## 2021-07-22 NOTE — REVIEW OF SYSTEMS
[Chest Discomfort] : chest discomfort [Palpitations] : palpitations [Under Stress] : under stress [Fever] : no fever [Headache] : no headache [Chills] : no chills [Blurry Vision] : no blurred vision [Seeing Double (Diplopia)] : no diplopia [Earache] : no earache [Sore Throat] : no sore throat [Sinus Pressure] : no sinus pressure [SOB] : no shortness of breath [Leg Claudication] : no intermittent leg claudication [Syncope] : no syncope [Cough] : no cough [Wheezing] : no wheezing [Abdominal Pain] : no abdominal pain [Nausea] : no nausea [Vomiting] : no vomiting [Change in Appetite] : no change in appetite [Dysuria] : no dysuria [Joint Pain] : no joint pain [Myalgia] : no myalgia [Rash] : no rash [Itching] : no itching [Dizziness] : no dizziness [Numbness (Hypoesthesia)] : no numbness [Convulsions] : no convulsions [Weakness] : no weakness [Limb Weakness (Paresis)] : no limb weakness (Paresis) [Confusion] : no confusion was observed [Memory Lapses Or Loss] : no memory lapses or loss [Depression] : no depression [Suicidal] : not suicidal [Easy Bleeding] : no tendency for easy bleeding [Swollen Glands] : no swollen glands

## 2021-10-20 ENCOUNTER — APPOINTMENT (OUTPATIENT)
Dept: CARDIOLOGY | Facility: HOSPITAL | Age: 31
End: 2021-10-20

## 2021-11-19 ENCOUNTER — APPOINTMENT (OUTPATIENT)
Dept: INTERNAL MEDICINE | Facility: CLINIC | Age: 31
End: 2021-11-19
Payer: MEDICAID

## 2021-11-19 VITALS
TEMPERATURE: 97.9 F | SYSTOLIC BLOOD PRESSURE: 108 MMHG | WEIGHT: 140 LBS | BODY MASS INDEX: 23.9 KG/M2 | RESPIRATION RATE: 16 BRPM | OXYGEN SATURATION: 98 % | HEART RATE: 90 BPM | DIASTOLIC BLOOD PRESSURE: 64 MMHG | HEIGHT: 64 IN

## 2021-11-19 PROCEDURE — G0008: CPT

## 2021-11-19 PROCEDURE — 99395 PREV VISIT EST AGE 18-39: CPT | Mod: 25

## 2021-11-19 PROCEDURE — 96127 BRIEF EMOTIONAL/BEHAV ASSMT: CPT

## 2021-11-19 PROCEDURE — 90686 IIV4 VACC NO PRSV 0.5 ML IM: CPT

## 2021-11-19 RX ORDER — OMEPRAZOLE 20 MG/1
20 TABLET, DELAYED RELEASE ORAL
Refills: 0 | Status: DISCONTINUED | COMMUNITY
End: 2021-11-19

## 2021-11-19 NOTE — PAST MEDICAL HISTORY
[Regular Cycle Intervals] : have been regular [Dysmenorrhea] : dysmenorrhea [Total Preg ___] : G[unfilled] [Definite ___ (Date)] : the last menstrual period was [unfilled]

## 2021-11-20 NOTE — HISTORY OF PRESENT ILLNESS
[Health Maintenance] : health maintenance [Spouse] : spouse [] :  [Working Full Time] : working full time [Occupation ___] : occupation: [unfilled] [Never Smoked Cigarettes] : has never smoked cigarettes [Rare Use] : rare alcohol use [Never] : has never used illicit drugs [Good] : good [Reg. Dental Visits] : She has regular dental visits [Premenopausal] : the patient is premenopausal [FreeTextEntry1] : \par CPE\par  [Binge Drinking] : denies binge drinking [Patient Concern] : no personal concern about alcohol use [Family Concern] : no family concern about alcohol use [Vision Problems] : She denies vision problems [Hearing Loss] : She denies hearing loss [de-identified] : 11/20 [de-identified] : hx flu shot 10/20, hx Tdap 8/17, hx hep A/B and HPV series [de-identified] : \par 30 yo F pmhx seasonal allergies, HLD, GERD, IBS for CPE\par \par Last seen in office 4/21/21 for f/u.\par \par Feeling well.\par Does not need med refills.\par Last ate 1 hr ago- had oatmeal wants labs today\par \par Reports is socially distancing and using precautions for covid prevention. \par Denies fever, chills, cough or sob.\par -hx Moderna vaccine- 1/13, 2/10/21\par \par hx CP, palpitations, dizziness - reports remains resolved since last visit\par -hx resolved 1 week after 3/21 visit with consistent PPI use and diet changes for reflux aggravating food avoidance.  \par -seen by cardio 7/21, advised EST and echo (pending) and to consider Holter if palpitations persist, labs ordered - pending.  F/U pending.\par -hx on/off , mid chest, dull or sharp, mainly at night, especially when laying and gets palpitations with it- overall gets less with sitting upright\par \par Reports intentionally losing wt with healthy diet and exercise, stable in past few months\par Exercise: runs on treadmill 2-3x/wk x 20 mins, occasional wt lifting - all done w/o sx's or limitations\par \par hx IBS, lactose intolerance- feels is controlled since restarted amitriptyline yesterday, notes was uncontrolled while off\par -on famotidine 40 mg qd x 1 mo per reflux with help in reflux sx's\par -followed by GI, Dr. Pereira, states last seen yesterday with amitriptyline restarted for presumed uncontrolled IBS sx' (cramping/diarrhea/bloating) and  is awaiting ? sucrose testing/EGD planned in 12/21.  Requests labs for r/o Celiac dz with testing done today as notes bloating with eating bread/wheat\par -on Citrucel qhs prn\par -on probiotic\par \par Reports daily soft BMs, no BRBPR or melena.  \par Denies n/v or abd pain.\par \par \par Denies  complaints.\par -open to pregnancy, aware of amitriptyline risk\par \par Denies depression or anxiety.  \par -reports on-going work stress, interested to see therapist for this\par -feels safe at home

## 2021-11-20 NOTE — HEALTH RISK ASSESSMENT
[0] : 2) Feeling down, depressed, or hopeless: Not at all (0) [Patient reported PAP Smear was normal] : Patient reported PAP Smear was normal [HIV Test offered] : HIV Test offered [Smoke Detector] : smoke detector [Carbon Monoxide Detector] : carbon monoxide detector [Seat Belt] :  uses seat belt [Sunscreen] : uses sunscreen [PHQ-2 Negative - No further assessment needed] : PHQ-2 Negative - No further assessment needed [Feels Safe at Home] : Feels safe at home [QWP1Glbaj] : 0 [Guns at Home] : no guns at home [PapSmearDate] : 03/21 [HIVDate] : 11/20 [HIVComments] : negative [HepatitisCDate] : 11/20 [HepatitisCComments] : negative

## 2021-11-20 NOTE — REVIEW OF SYSTEMS
[Negative] : Respiratory [FreeTextEntry5] : see HPI [FreeTextEntry7] : see HPI [de-identified] : see HPI

## 2021-11-20 NOTE — ASSESSMENT
[FreeTextEntry1] : \par 32 yo F pmhx seasonal allergies, HLD, GERD, IBS for CPE\par \par \par HLD- 11/20 Tchol 225   HDL 66\par -low fat diet, exercise advised\par -check lipids\par \par hx atypical CP a/w palpitations, hx transient dizziness, recent IBS flare a/w low appetite- suspect MSK etiology of CP +/- GI etiology.  Now resolved s/p PPI course and diet changes\par -3/21 EKG: NSR @ 67 bmp, ? LAE no acute pathology\par -3/21 cbc/cmp/lipase all unremarkable\par -seen by cardio 7/21, advised EST and echo (pending) and to consider Holter if palpitations persist, labs ordered - pending.  F/U pending.\par -followed by GI, no famotidine\par -advised to avoid reflux aggravating foods and NSAIDs/ASA \par -avoid meals near bedtime, elevate HOB\par -advised prompt medical eval if sx's worsen or new sx's arise\par \par GERD, hx IBS, lactose intolerance- \par -on famotidine 40 mg qd x 1 mo per reflux with help in reflux sx's\par -followed by GI, Dr. Pereira, states last seen yesterday with amitriptyline restarted for presumed uncontrolled IBS sx' (cramping/diarrhea/bloating) and is awaiting ? sucrose testing/EGD planned in 12/21. Requests labs for r/o Celiac dz with testing done today as notes bloating with eating bread/wheat - ordered.  \par -avoids lactose-containing foods\par -on amitriptyline since 11/21, aware of risk if becomes pregnant\par -on probiotic\par -asked to forward record\par \par stress- mainly 2/2 work, denies depression/anxiety\par -office staff to assist with appt for pt to see office therapist, Phill Wilder\par -advised to f/u if sx's worsen\par \par MISC: Continued social distancing and measure for covid19 prevention encouraged. \par -hx negative covid AB 10/20\par -hx Moderna vaccine- 1/13, 2/10/21\par \par \par HCM\par -check screening labs; agreeable to HIV/STD screening\par -flu shot today\par -hx Tdap 8/17\par -hx hep A series\par -hx hep B series, immune per 11/20 labs\par -hx HPV series\par -hx negative PAP 3/21 with GYN per pt\par -derm referral for screening- pending. Regular use of sun block for skin cancer prevention advised.\par -optho referral for screening per request- pending\par -yearly dental screening advised\par \par Pt's cell: [phone]\par \par Labs drawn in office today.\par \par

## 2021-11-20 NOTE — PHYSICAL EXAM
[No Acute Distress] : no acute distress [Well-Appearing] : well-appearing [Normal Sclera/Conjunctiva] : normal sclera/conjunctiva [PERRL] : pupils equal round and reactive to light [EOMI] : extraocular movements intact [Normal Outer Ear/Nose] : the outer ears and nose were normal in appearance [Normal Oropharynx] : the oropharynx was normal [Normal Nasal Mucosa] : the nasal mucosa was normal [No Lymphadenopathy] : no lymphadenopathy [Supple] : supple [Thyroid Normal, No Nodules] : the thyroid was normal and there were no nodules present [No Respiratory Distress] : no respiratory distress  [Clear to Auscultation] : lungs were clear to auscultation bilaterally [Normal Rate] : normal rate  [Regular Rhythm] : with a regular rhythm [Normal S1, S2] : normal S1 and S2 [No Murmur] : no murmur heard [Pedal Pulses Present] : the pedal pulses are present [No Edema] : there was no peripheral edema [Soft] : abdomen soft [Non-distended] : non-distended [No Masses] : no abdominal mass palpated [No HSM] : no HSM [Normal Supraclavicular Nodes] : no supraclavicular lymphadenopathy [Normal Posterior Cervical Nodes] : no posterior cervical lymphadenopathy [Normal Anterior Cervical Nodes] : no anterior cervical lymphadenopathy [No CVA Tenderness] : no CVA  tenderness [No Spinal Tenderness] : no spinal tenderness [No Joint Swelling] : no joint swelling [No Rash] : no rash [No Focal Deficits] : no focal deficits [Normal Gait] : normal gait [Normal Affect] : the affect was normal [Alert and Oriented x3] : oriented to person, place, and time [Normal TMs] : both tympanic membranes were normal [Non Tender] : non-tender [de-identified] : scattered freckles

## 2021-11-23 ENCOUNTER — NON-APPOINTMENT (OUTPATIENT)
Age: 31
End: 2021-11-23

## 2021-11-23 LAB
ALBUMIN SERPL ELPH-MCNC: 4.7 G/DL
ALP BLD-CCNC: 61 U/L
ALT SERPL-CCNC: 10 U/L
ANION GAP SERPL CALC-SCNC: 13 MMOL/L
AST SERPL-CCNC: 11 U/L
BASOPHILS # BLD AUTO: 0.04 K/UL
BASOPHILS NFR BLD AUTO: 0.6 %
BILIRUB SERPL-MCNC: 0.4 MG/DL
BUN SERPL-MCNC: 12 MG/DL
C TRACH RRNA SPEC QL NAA+PROBE: NOT DETECTED
CALCIUM SERPL-MCNC: 10.1 MG/DL
CHLORIDE SERPL-SCNC: 103 MMOL/L
CHOLEST SERPL-MCNC: 156 MG/DL
CO2 SERPL-SCNC: 25 MMOL/L
CREAT SERPL-MCNC: 0.84 MG/DL
ENDOMYSIUM IGA SER QL: NEGATIVE
ENDOMYSIUM IGA TITR SER: NORMAL
EOSINOPHIL # BLD AUTO: 0.03 K/UL
EOSINOPHIL NFR BLD AUTO: 0.4 %
ESTIMATED AVERAGE GLUCOSE: 103 MG/DL
GLUCOSE SERPL-MCNC: 87 MG/DL
HBA1C MFR BLD HPLC: 5.2 %
HBV CORE IGG+IGM SER QL: NONREACTIVE
HBV SURFACE AB SER QL: REACTIVE
HBV SURFACE AG SER QL: NONREACTIVE
HCT VFR BLD CALC: 38.3 %
HCV AB SER QL: NONREACTIVE
HCV S/CO RATIO: 0.07 S/CO
HDLC SERPL-MCNC: 50 MG/DL
HGB BLD-MCNC: 11.9 G/DL
HIV1+2 AB SPEC QL IA.RAPID: NONREACTIVE
IGA SER QL IEP: 109 MG/DL
IMM GRANULOCYTES NFR BLD AUTO: 0.1 %
LDLC SERPL CALC-MCNC: 91 MG/DL
LYMPHOCYTES # BLD AUTO: 2.14 K/UL
LYMPHOCYTES NFR BLD AUTO: 31.8 %
MAN DIFF?: NORMAL
MCHC RBC-ENTMCNC: 25.9 PG
MCHC RBC-ENTMCNC: 31.1 GM/DL
MCV RBC AUTO: 83.4 FL
MONOCYTES # BLD AUTO: 0.38 K/UL
MONOCYTES NFR BLD AUTO: 5.7 %
N GONORRHOEA RRNA SPEC QL NAA+PROBE: NOT DETECTED
NEUTROPHILS # BLD AUTO: 4.12 K/UL
NEUTROPHILS NFR BLD AUTO: 61.4 %
NONHDLC SERPL-MCNC: 106 MG/DL
PLATELET # BLD AUTO: 231 K/UL
POTASSIUM SERPL-SCNC: 4.5 MMOL/L
PROT SERPL-MCNC: 7.1 G/DL
RBC # BLD: 4.59 M/UL
RBC # FLD: 13.8 %
SODIUM SERPL-SCNC: 141 MMOL/L
SOURCE AMPLIFICATION: NORMAL
T PALLIDUM AB SER QL IA: NEGATIVE
TRIGL SERPL-MCNC: 75 MG/DL
TSH SERPL-ACNC: 0.83 UIU/ML
TTG IGA SER IA-ACNC: <1.2 U/ML
TTG IGA SER-ACNC: NEGATIVE
TTG IGG SER IA-ACNC: <1.2 U/ML
TTG IGG SER IA-ACNC: NEGATIVE
WBC # FLD AUTO: 6.72 K/UL

## 2021-12-20 ENCOUNTER — TRANSCRIPTION ENCOUNTER (OUTPATIENT)
Age: 31
End: 2021-12-20

## 2021-12-20 ENCOUNTER — APPOINTMENT (OUTPATIENT)
Dept: FAMILY MEDICINE | Facility: CLINIC | Age: 31
End: 2021-12-20

## 2021-12-22 ENCOUNTER — TRANSCRIPTION ENCOUNTER (OUTPATIENT)
Age: 31
End: 2021-12-22

## 2022-02-13 ENCOUNTER — NON-APPOINTMENT (OUTPATIENT)
Age: 32
End: 2022-02-13

## 2022-02-15 ENCOUNTER — TRANSCRIPTION ENCOUNTER (OUTPATIENT)
Age: 32
End: 2022-02-15

## 2022-02-15 ENCOUNTER — NON-APPOINTMENT (OUTPATIENT)
Age: 32
End: 2022-02-15

## 2022-02-18 ENCOUNTER — APPOINTMENT (OUTPATIENT)
Dept: INTERNAL MEDICINE | Facility: CLINIC | Age: 32
End: 2022-02-18
Payer: MEDICAID

## 2022-02-18 VITALS
OXYGEN SATURATION: 99 % | HEART RATE: 80 BPM | DIASTOLIC BLOOD PRESSURE: 60 MMHG | WEIGHT: 143 LBS | BODY MASS INDEX: 24.41 KG/M2 | RESPIRATION RATE: 16 BRPM | HEIGHT: 64 IN | SYSTOLIC BLOOD PRESSURE: 98 MMHG | TEMPERATURE: 98.1 F

## 2022-02-18 DIAGNOSIS — Z86.39 PERSONAL HISTORY OF OTHER ENDOCRINE, NUTRITIONAL AND METABOLIC DISEASE: ICD-10-CM

## 2022-02-18 DIAGNOSIS — Z23 ENCOUNTER FOR IMMUNIZATION: ICD-10-CM

## 2022-02-18 PROCEDURE — 99213 OFFICE O/P EST LOW 20 MIN: CPT

## 2022-02-18 RX ORDER — MULTIVITAMIN
CAPSULE ORAL
Refills: 0 | Status: DISCONTINUED | COMMUNITY
End: 2022-02-18

## 2022-02-18 NOTE — REVIEW OF SYSTEMS
[Negative] : Neurological [FreeTextEntry5] : see HPI [FreeTextEntry7] : see HPI [de-identified] : see HPI

## 2022-02-18 NOTE — HISTORY OF PRESENT ILLNESS
[FreeTextEntry1] : \par f/u [de-identified] : \par 32 yo F pmhx seasonal allergies, HLD, GERD, IBS for f/u\par \par Last seen in office 11/19/21 for CPE with labs done.\par \par Feeling well.\par Does not need med refills.\par \par Reports is socially distancing and using precautions for covid prevention. \par Denies fever, chills, cough or sob.\par -hx Moderna vaccine- 1/13, 2/10/21; hx booster 1/22\par \par hx CP, palpitations, dizziness - reports remains resolved since last visit\par -hx resolved 1 week after 3/21 visit with consistent PPI use and diet changes for reflux aggravating food avoidance.  \par -seen by cardio 7/21, advised EST and echo (pending) and to consider Holter if palpitations persist, labs ordered - pending.  F/U pending.\par -hx on/off , mid chest, dull or sharp, mainly at night, especially when laying and gets palpitations with it- overall gets less with sitting upright\par \par Reports intentionally losing wt with healthy diet and exercise, stable in past few months\par Exercise: runs on treadmill 2-3x/wk x 20 mins, occasional wt lifting - all done w/o sx's or limitations\par \par hx IBS, lactose intolerance- feels is controlled since restarted amitriptyline 11/21 (taking 1/2 tab), notes was uncontrolled while off\par -on famotidine 40 mg qd with help in reflux sx's\par -followed by GI, Dr. Pereira, states last seen 11/21 with amitriptyline restarted for presumed uncontrolled IBS sx's (cramping/diarrhea/bloating) and  is awaiting ? sucrose testing/EGD planned - pending.  \par -on Citrucel qhs prn\par -on probiotic\par \par Reports daily soft BMs, no BRBPR or melena.  \par Denies n/v or abd pain.\par \par \par Denies  complaints.\par -open to pregnancy, aware of amitriptyline risk\par \par Denies depression or anxiety.  \par -hx  on-going work stress\par -feels safe at home\par -has not yet met with  therapist Phill Wilder - [facility] x3, declines as feels is not needed

## 2022-02-18 NOTE — PHYSICAL EXAM
[No Acute Distress] : no acute distress [Well-Appearing] : well-appearing [Normal Sclera/Conjunctiva] : normal sclera/conjunctiva [PERRL] : pupils equal round and reactive to light [EOMI] : extraocular movements intact [Normal Outer Ear/Nose] : the outer ears and nose were normal in appearance [Normal Oropharynx] : the oropharynx was normal [Normal Nasal Mucosa] : the nasal mucosa was normal [No Lymphadenopathy] : no lymphadenopathy [Supple] : supple [Thyroid Normal, No Nodules] : the thyroid was normal and there were no nodules present [No Respiratory Distress] : no respiratory distress  [Clear to Auscultation] : lungs were clear to auscultation bilaterally [Normal Rate] : normal rate  [Regular Rhythm] : with a regular rhythm [Normal S1, S2] : normal S1 and S2 [No Murmur] : no murmur heard [Pedal Pulses Present] : the pedal pulses are present [No Edema] : there was no peripheral edema [Soft] : abdomen soft [Non Tender] : non-tender [Non-distended] : non-distended [No Masses] : no abdominal mass palpated [No HSM] : no HSM [Normal Supraclavicular Nodes] : no supraclavicular lymphadenopathy [Normal Posterior Cervical Nodes] : no posterior cervical lymphadenopathy [Normal Anterior Cervical Nodes] : no anterior cervical lymphadenopathy [No CVA Tenderness] : no CVA  tenderness [No Spinal Tenderness] : no spinal tenderness [No Joint Swelling] : no joint swelling [No Rash] : no rash [No Focal Deficits] : no focal deficits [Normal Gait] : normal gait [Normal Affect] : the affect was normal [Alert and Oriented x3] : oriented to person, place, and time [de-identified] : scattered freckles

## 2022-02-18 NOTE — ASSESSMENT
[FreeTextEntry1] : \par 32 yo F pmhx seasonal allergies, HLD, GERD, IBS for f/u\par \par \par HLD- better, 11/21 Tchol 156 TG 75 LDL 91 HDL 50\par -low fat diet, exercise advised\par \par hx atypical CP a/w palpitations, hx transient dizziness, recent IBS flare a/w low appetite- suspect MSK etiology of CP +/- GI etiology.  Now resolved s/p PPI course and diet changes\par -3/21 EKG: NSR @ 67 bmp, ? LAE no acute pathology\par -3/21 cbc/cmp/lipase all unremarkable\par -seen by cardio 7/21, advised EST and echo (pending) and to consider Holter if palpitations persist, labs ordered - pending.  F/U pending.\par -followed by GI, no famotidine\par -advised to avoid reflux aggravating foods and NSAIDs/ASA \par -avoid meals near bedtime, elevate HOB\par -advised prompt medical eval if sx's worsen or new sx's arise\par \par GERD, hx IBS, lactose intolerance- \par -on famotidine 40 mg qd with help in reflux sx's\par -followed by GI, Dr. Pereira, states last seen yesterday with amitriptyline restarted for presumed uncontrolled IBS sx' (cramping/diarrhea/bloating) and is awaiting ? sucrose testing/EGD planned - pending\par -11/21 cbc/cmp, transglutaminase AB/endomysial AB unremarkable\par -avoids lactose-containing foods\par -on amitriptyline 25 (1/2 tab) qd since 11/21, aware of risk if becomes pregnant\par -on probiotic\par -asked to forward record\par \par stress- reports controlled, mainly 2/2 work, denies depression/anxiety\par -has not yet met with  therapist Phill Wilder - [facility] x3, now declines as feels is not needed\par -advised to f/u if sx's worsen\par \par MISC: Continued social distancing and measure for covid19 prevention encouraged. \par -hx negative covid AB 10/20\par -hx Moderna vaccine- 1/13, 2/10/21; hx booster 1/22\par \par \par HCM\par -hx CPE 11/21\par -hx flu shot 11/21\par -hx Tdap 8/17\par -hx hep A series\par -hx hep B series, immune per 11/21 labs\par -hx HPV series\par -hx negative PAP 3/21 with GYN per pt\par -derm referral for screening- pending. Regular use of sun block for skin cancer prevention advised.\par -optho referral for screening per request- pending\par \par Pt's cell: [phone]\par \par

## 2022-04-11 PROBLEM — Z11.59 SCREENING FOR VIRAL DISEASE: Status: RESOLVED | Noted: 2020-11-18 | Resolved: 2021-03-09

## 2022-05-11 ENCOUNTER — NON-APPOINTMENT (OUTPATIENT)
Age: 32
End: 2022-05-11

## 2022-05-11 ENCOUNTER — APPOINTMENT (OUTPATIENT)
Dept: GASTROENTEROLOGY | Facility: CLINIC | Age: 32
End: 2022-05-11
Payer: MEDICAID

## 2022-05-11 VITALS
TEMPERATURE: 97.7 F | DIASTOLIC BLOOD PRESSURE: 72 MMHG | HEIGHT: 64 IN | WEIGHT: 145 LBS | OXYGEN SATURATION: 98 % | HEART RATE: 91 BPM | BODY MASS INDEX: 24.75 KG/M2 | SYSTOLIC BLOOD PRESSURE: 118 MMHG

## 2022-05-11 DIAGNOSIS — R14.0 ABDOMINAL DISTENSION (GASEOUS): ICD-10-CM

## 2022-05-11 DIAGNOSIS — E73.9 LACTOSE INTOLERANCE, UNSPECIFIED: ICD-10-CM

## 2022-05-11 PROCEDURE — 99204 OFFICE O/P NEW MOD 45 MIN: CPT

## 2022-05-11 NOTE — HISTORY OF PRESENT ILLNESS
[Heartburn] : denies heartburn [Nausea] : denies nausea [Vomiting] : denies vomiting [Diarrhea] : denies diarrhea [Constipation] : denies constipation [Yellow Skin Or Eyes (Jaundice)] : denies jaundice [Wt Gain ___ Lbs] : no recent weight gain [Wt Loss ___ Lbs] : no recent weight loss [Abdominal Pain] : abdominal pain [Abdominal Swelling] : abdominal swelling [GERD] : no gastroesophageal reflux disease [Hiatus Hernia] : no hiatus hernia [Peptic Ulcer Disease] : no peptic ulcer disease [Pancreatitis] : no pancreatitis [Cholelithiasis] : no cholelithiasis [Kidney Stone] : no kidney stone [Inflammatory Bowel Disease] : no inflammatory bowel disease [Irritable Bowel Syndrome] : no irritable bowel syndrome [Diverticulitis] : no diverticulitis [Alcohol Abuse] : no alcohol abuse [Malignancy] : no malignancy [Abdominal Surgery] : no abdominal surgery [Appendectomy] : no appendectomy [Cholecystectomy] : no cholecystectomy [de-identified] : KANA BRAND 32 year F with IBS for 5 years here for a 2nd opinion.\par \par Patient has been under the care of Dr. Cheatham ([place]), apparently underwent an endoscopy x 4 and a colonoscopy and was advised that she has IBS.\par \par Her symptoms include, crampy abdominal pain, urgency, and abdominal bloating. Patient has been on amitriptyline 25 mg QHS which helped her GI symptoms but gave her mood swings. She also complains of GERD, NUD and flatus. Patient took famotidine with relief. \par \par She is lactose intolerant but does eat diary. She tried Peppermint tea and Bentyl without any relief. Currently she takes Probiotics Nature wise and IBS Culturelle which reduces her crampy abdominal pain.\par \par Patient states of a good appetite, no loss of weight, bowel movement once daily, formed and brown stools without blood or mucus. Denies nausea, vomiting, melena, hematochezia, or hematemesis.\par \par FH: No 1st degree or 2nd degree relative with colon cancer, gastric cancer or pancreatic cancer.\par \par  \par

## 2022-05-11 NOTE — ASSESSMENT
[FreeTextEntry1] : KANA BRAND 32 year F with IBS for 5 years here for a 2nd opinion.\par \par 1. IBS-M\par - I will prescribe hyoscyamine SL as needed for crampy abdominal pain\par - I advised her to continue Culturelle IBS and nature wise for now. \par - Patient is not keen to take amitriptyline.\par \par  2. Abdominal bloating\par - I advised her to avoid dairy.\par - I also advised her to obtain results of her endoscopy, colonoscopy, biopsies and blood reresults over to me for review prior to further investigations.\par \par Follow up in 2 months.

## 2022-05-11 NOTE — ADDENDUM
[FreeTextEntry1] : The risks and benefits of my recommendations, as well as other treatment options were discussed with the patient today. Questions were answered.\par \par Please feel free to contact for any questions or concerns at my office  in the telephone numbers listed below.\par \par [address], [address], [place], [place], [address] Telephone: [phone] Fax: [phone]\par \par \par

## 2022-05-11 NOTE — CONSULT LETTER
[Dear  ___] : Dear  [unfilled], [Consult Letter:] : I had the pleasure of evaluating your patient, [unfilled]. [Please see my note below.] : Please see my note below. [Consult Closing:] : Thank you very much for allowing me to participate in the care of this patient.  If you have any questions, please do not hesitate to contact me. [Sincerely,] : Sincerely, [FreeTextEntry3] : Amrit Bright MD\par \par Assistant Clinical Professor \par Division of Gastroenterology at [facility]\par [facility]\par [facility]\par [facility]\par \par [address], [address], [place] [address]\par Tel: [phone] | Fax: [phone]\par \par Twitter (Personal): @[email or web address] \par \par \par

## 2022-06-03 ENCOUNTER — APPOINTMENT (OUTPATIENT)
Dept: INTERNAL MEDICINE | Facility: CLINIC | Age: 32
End: 2022-06-03
Payer: MEDICAID

## 2022-06-03 VITALS
BODY MASS INDEX: 24.07 KG/M2 | HEIGHT: 64 IN | RESPIRATION RATE: 16 BRPM | OXYGEN SATURATION: 99 % | HEART RATE: 79 BPM | SYSTOLIC BLOOD PRESSURE: 118 MMHG | TEMPERATURE: 98.4 F | WEIGHT: 141 LBS | DIASTOLIC BLOOD PRESSURE: 72 MMHG

## 2022-06-03 DIAGNOSIS — E66.3 OVERWEIGHT: ICD-10-CM

## 2022-06-03 DIAGNOSIS — F43.9 REACTION TO SEVERE STRESS, UNSPECIFIED: ICD-10-CM

## 2022-06-03 PROCEDURE — 99213 OFFICE O/P EST LOW 20 MIN: CPT

## 2022-06-03 RX ORDER — METHYLCELLULOSE 2 G/10.2G
POWDER, FOR SOLUTION ORAL
Refills: 0 | Status: DISCONTINUED | COMMUNITY
End: 2022-06-03

## 2022-06-03 RX ORDER — HYOSCYAMINE SULFATE 0.12 MG/1
0.12 TABLET, ORALLY DISINTEGRATING ORAL
Qty: 120 | Refills: 3 | Status: DISCONTINUED | COMMUNITY
Start: 2022-05-11 | End: 2022-06-03

## 2022-06-03 RX ORDER — AMITRIPTYLINE HYDROCHLORIDE 25 MG/1
25 TABLET, FILM COATED ORAL
Refills: 0 | Status: DISCONTINUED | COMMUNITY
End: 2022-06-03

## 2022-06-03 NOTE — PHYSICAL EXAM
[No Acute Distress] : no acute distress [Well-Appearing] : well-appearing [Normal Sclera/Conjunctiva] : normal sclera/conjunctiva [PERRL] : pupils equal round and reactive to light [EOMI] : extraocular movements intact [Normal Outer Ear/Nose] : the outer ears and nose were normal in appearance [Normal Oropharynx] : the oropharynx was normal [Normal Nasal Mucosa] : the nasal mucosa was normal [No Lymphadenopathy] : no lymphadenopathy [Supple] : supple [Thyroid Normal, No Nodules] : the thyroid was normal and there were no nodules present [No Respiratory Distress] : no respiratory distress  [Clear to Auscultation] : lungs were clear to auscultation bilaterally [Normal Rate] : normal rate  [Regular Rhythm] : with a regular rhythm [Normal S1, S2] : normal S1 and S2 [No Murmur] : no murmur heard [Pedal Pulses Present] : the pedal pulses are present [No Edema] : there was no peripheral edema [Soft] : abdomen soft [Non Tender] : non-tender [Non-distended] : non-distended [No Masses] : no abdominal mass palpated [No HSM] : no HSM [Normal Supraclavicular Nodes] : no supraclavicular lymphadenopathy [Normal Posterior Cervical Nodes] : no posterior cervical lymphadenopathy [Normal Anterior Cervical Nodes] : no anterior cervical lymphadenopathy [No CVA Tenderness] : no CVA  tenderness [No Spinal Tenderness] : no spinal tenderness [No Joint Swelling] : no joint swelling [No Rash] : no rash [No Focal Deficits] : no focal deficits [Normal Gait] : normal gait [Normal Affect] : the affect was normal [Alert and Oriented x3] : oriented to person, place, and time [Grossly Normal Strength/Tone] : grossly normal strength/tone [de-identified] : scattered freckles

## 2022-06-03 NOTE — ASSESSMENT
[FreeTextEntry1] : \par 32 yo F pmhx seasonal allergies, HLD, GERD, IBS for f/u\par \par \par HLD- better, 11/21 Tchol 156 TG 75 LDL 91 HDL 50\par -low fat diet, exercise advised\par \par hx atypical CP a/w palpitations, hx transient dizziness, recent IBS flare a/w low appetite- suspect MSK etiology of CP +/- GI etiology.  Resolved s/p PPI course and diet changes\par -3/21 EKG: NSR @ 67 bmp, ? LAE no acute pathology\par -3/21 cbc/cmp/lipase all unremarkable\par -seen by cardio 7/21, advised EST and echo (pending) and to consider Holter if palpitations persist, labs ordered - pending.  F/U pending.\par -followed by GI, no famotidine\par -advised to avoid reflux aggravating foods and NSAIDs/ASA \par -avoid meals near bedtime, elevate HOB\par -advised prompt medical eval if sx's worsen or new sx's arise\par \par GERD, hx IBS, lactose intolerance- chronic, intermittent sx's\par -11/21 cbc/cmp, transglutaminase AB/endomysial AB unremarkable\par -reports has tapered off amitriptyline per GI\par -on famotidine 40 mg qd with help in reflux sx's\par -hx seen by new GI, , in 5/22 when advised trial of hyoscyamine, cont. probiotic, keep food diary and forward prior GI records for review. F/U pending - states took hyoscyamine x 1 week, did not help so stopped. Had f/u with prior seen GI seen and has been tapered off amitriptyline 2/2 SEs (irritability). Last seen 6/1/22 started on cholestyramine powder and dicyclomine 10 mg bid (taking 1x/d as gets drowsy with use). States planned for trial of desipramine 10 mg if current meds don't help (has Rx). Also states EGD (+/- colonoscopy) pending in 2 weeks.  Asked to forward records.\par -advised to avoids lactose-containing foods\par -on probiotic\par \par stress- reports controlled, mainly 2/2 work, denies depression/anxiety\par -has not yet met with  therapist Phill Wilder - [facility] x3- has since declined as feels is not needed\par -advised to f/u if sx's worsen\par \par MISC: Continued social distancing and measure for covid19 prevention encouraged. \par -hx negative covid AB 10/20\par -hx Moderna vaccine- 1/21, 2/21; hx booster 1/22\par \par \par HCM\par -hx CPE 11/21, yearly advised\par -hx flu shot 11/21\par -hx Tdap 8/17\par -hx hep A series\par -hx hep B series, immune per 11/21 labs\par -hx HPV series\par -hx negative PAP 3/21 with GYN per pt\par -derm referral for screening- pending. Regular use of sun block for skin cancer prevention advised.\par -optho referral for screening per request- pending 6/15/22\par \par Pt's cell: [phone]\par Pt declines f/u sooner than next CPE.  Advised to f/u sooner as needed.\par

## 2022-06-03 NOTE — HISTORY OF PRESENT ILLNESS
[FreeTextEntry1] : \par f/u [de-identified] : \par 33 yo F pmhx seasonal allergies, HLD, GERD, IBS for f/u\par \par Last seen in office 2/18/22 for f/u.\par \par Feeling well.\par Does not need med refills.\par \par Reports is socially distancing and using precautions for covid prevention. \par Denies fever, chills, cough or sob.\par -hx Moderna vaccine- 1/21, 2/21; hx booster 1/22\par \par hx CP, palpitations, dizziness - reports remains resolved > 6 months\par -hx resolved 1 week after 3/21 visit with consistent PPI use and diet changes for reflux aggravating food avoidance.  \par -seen by cardio 7/21, advised EST and echo (pending) and to consider Holter if palpitations persist, labs ordered - pending.  F/U pending.\par -hx on/off , mid chest, dull or sharp, mainly at night, especially when laying and gets palpitations with it- overall gets less with sitting upright\par \par Reports intentionally losing wt with healthy diet \par Exercise: wt lifting 4x/wk x  1hr - all done w/o sx's or limitations\par \par hx IBS, lactose intolerance- reports chronic, intermittent stool urgency, cramping, diarrhea and lower abdominal pain 2/2 IBS - overall stable currently, pepto use prn helps\par -reports has tapered off amitriptyline per GI\par -on famotidine 40 mg qd with help in reflux sx's\par -hx seen by new GI, , in 5/22 when advised trial of hyoscyamine, cont. probiotic, keep food diary and forward prior GI records for review.  F/U pending - states took hyoscyamine x 1 week, did not help so stopped.  Had f/u with prior seen GI seen and has been tapered off amitriptyline 2/2 SEs (irritability).  Last seen 6/1/22 started on cholestyramine powder and dicyclomine 10 mg bid (taking 1x/d as gets drowsy with use).  States planned for trial of desipramine 10 mg if current meds don't help (has Rx).  Also states EGD (+/- colonoscopy) pending in 2 weeks.\par \par Last BM today, soft\par Reports BRBPR, melena, n/v or abd pain.\par \par Denies  complaints.\par \par Denies depression or anxiety.  \par -hx  on-going work stress\par -feels safe at home\par -has not yet met with  therapist Phill Wilder [facility] x3, declines as feels is not needed\par

## 2022-06-15 ENCOUNTER — APPOINTMENT (OUTPATIENT)
Dept: OPHTHALMOLOGY | Facility: CLINIC | Age: 32
End: 2022-06-15

## 2022-07-16 ENCOUNTER — APPOINTMENT (OUTPATIENT)
Dept: OPHTHALMOLOGY | Facility: CLINIC | Age: 32
End: 2022-07-16

## 2022-07-16 ENCOUNTER — NON-APPOINTMENT (OUTPATIENT)
Age: 32
End: 2022-07-16

## 2022-07-16 PROCEDURE — 92285 EXTERNAL OCULAR PHOTOGRAPHY: CPT

## 2022-07-16 PROCEDURE — 92004 COMPRE OPH EXAM NEW PT 1/>: CPT

## 2022-10-31 ENCOUNTER — APPOINTMENT (OUTPATIENT)
Dept: INTERNAL MEDICINE | Facility: CLINIC | Age: 32
End: 2022-10-31

## 2022-10-31 VITALS
SYSTOLIC BLOOD PRESSURE: 116 MMHG | HEIGHT: 64 IN | DIASTOLIC BLOOD PRESSURE: 62 MMHG | HEART RATE: 74 BPM | OXYGEN SATURATION: 98 % | WEIGHT: 141 LBS | RESPIRATION RATE: 16 BRPM | TEMPERATURE: 98.4 F | BODY MASS INDEX: 24.07 KG/M2

## 2022-10-31 DIAGNOSIS — R09.82 POSTNASAL DRIP: ICD-10-CM

## 2022-10-31 DIAGNOSIS — Z88.9 ALLERGY STATUS TO UNSPECIFIED DRUGS, MEDICAMENTS AND BIOLOGICAL SUBSTANCES: ICD-10-CM

## 2022-10-31 PROCEDURE — 99213 OFFICE O/P EST LOW 20 MIN: CPT

## 2022-10-31 RX ORDER — DESIPRAMINE 10 MG/1
10 TABLET, FILM COATED ORAL
Qty: 30 | Refills: 0 | Status: DISCONTINUED | COMMUNITY
Start: 2022-06-01 | End: 2022-10-31

## 2022-10-31 NOTE — ASSESSMENT
[FreeTextEntry1] : \par 31 yo F pmhx seasonal allergies, HLD, GERD, IBS for acute visit\par \par cough- s/p UC 10/3 with dx acute sinusitis s/p Amoxicillin course with subsequent cough onset s/p repeat UC visit 10/27 dx'd bronchitis with MDI/Tessalon/Prednisone given and stable sx's since reported.  Well appearing, VSS.\par -no clear indication for abx\par -compelete prednisone as given by UC\par -check RVP, collected\par -check cxr\par -advised Flonase prn and Claritin prn (taken seasonally per pt)\par -Tessalon prn, risks/benefits discussed\par -advised famotidine trial for possible contribution of reflux to cough given recent prednisone use\par -declines MDI as felt made postnasal drip worse\par -advised prompt medical eval if sx's worsen or new sx's arise\par \par \par HCM\par -hx CPE 11/21, yearly advised\par \par Pt has prior scheduled office appt 11/11/22 for CPE and fasting labs.  Advised f/u sooner as needed.\par \par

## 2022-10-31 NOTE — HISTORY OF PRESENT ILLNESS
[FreeTextEntry8] : \par 31 yo F pmhx seasonal allergies, HLD, GERD, IBS for acute visit\par \par c/o cough\par \par States went to  10/3/22 c/o sinus sx's a/w facial discomfort- had negative covid test, dx'd with sinus infection and given Amoxicillin course with improved sx's.  States few days later had onset of cough a/w postnasal drip and need to clear throat.\par \par Went to  10/27/22 c/o CP after coughing episodes, states dx'd bronchitis and given Tessalon Perles x 3d, Albuterol MDI and prednisone course.  No testing done.\par \par States CP has resolved. \par Reports cough stable- is dry , a/w postnasal drip and occasional sore throat due to coughing.  Used Tessalon x3d, but not sure if helped.  Tried MDI- felt make postnasal drip worse so stopped\par Has 1d left of prednisone\par +min clear rhinitis and sinus congestion on/off\par Denies reflux- no recent famotidine use.\par \par Denies fever, chills, HA, facial pain, sob, CP, myalgias, arthralgias or rash.\par Denies GI  complaints - reports IBS is controlled, followed by GI - is off desipramine and taking dicyclomine\par No known sick contacts.\par \par Hx Moderna vaccine- 1/21, 2/21; hx booster 1/22\par Hx flu shot 11/21\par

## 2022-10-31 NOTE — PHYSICAL EXAM
[No Acute Distress] : no acute distress [Well-Appearing] : well-appearing [Normal Sclera/Conjunctiva] : normal sclera/conjunctiva [PERRL] : pupils equal round and reactive to light [EOMI] : extraocular movements intact [Normal Outer Ear/Nose] : the outer ears and nose were normal in appearance [Normal TMs] : both tympanic membranes were normal [Normal Nasal Mucosa] : the nasal mucosa was normal [No Lymphadenopathy] : no lymphadenopathy [Supple] : supple [No Respiratory Distress] : no respiratory distress  [Clear to Auscultation] : lungs were clear to auscultation bilaterally [Normal Rate] : normal rate  [Regular Rhythm] : with a regular rhythm [Normal S1, S2] : normal S1 and S2 [No Murmur] : no murmur heard [No Edema] : there was no peripheral edema [Soft] : abdomen soft [Non Tender] : non-tender [No HSM] : no HSM [Normal Supraclavicular Nodes] : no supraclavicular lymphadenopathy [Normal Posterior Cervical Nodes] : no posterior cervical lymphadenopathy [Normal Anterior Cervical Nodes] : no anterior cervical lymphadenopathy [No CVA Tenderness] : no CVA  tenderness [No Spinal Tenderness] : no spinal tenderness [No Joint Swelling] : no joint swelling [Grossly Normal Strength/Tone] : grossly normal strength/tone [No Rash] : no rash [No Focal Deficits] : no focal deficits [Normal Gait] : normal gait [Normal Affect] : the affect was normal [Alert and Oriented x3] : oriented to person, place, and time [de-identified] : +cobblestoning, no pharyngeal exudate or erythema; no sinus tenderness, min clear nasal d/c

## 2022-11-01 LAB
RAPID RVP RESULT: NOT DETECTED
SARS-COV-2 RNA PNL RESP NAA+PROBE: NOT DETECTED

## 2022-11-02 ENCOUNTER — NON-APPOINTMENT (OUTPATIENT)
Age: 32
End: 2022-11-02

## 2022-11-04 ENCOUNTER — EMERGENCY (EMERGENCY)
Facility: HOSPITAL | Age: 32
LOS: 1 days | Discharge: HOME | End: 2022-11-04
Attending: STUDENT IN AN ORGANIZED HEALTH CARE EDUCATION/TRAINING PROGRAM | Admitting: STUDENT IN AN ORGANIZED HEALTH CARE EDUCATION/TRAINING PROGRAM

## 2022-11-04 VITALS
SYSTOLIC BLOOD PRESSURE: 123 MMHG | RESPIRATION RATE: 16 BRPM | HEART RATE: 81 BPM | DIASTOLIC BLOOD PRESSURE: 93 MMHG | OXYGEN SATURATION: 100 % | TEMPERATURE: 98 F

## 2022-11-04 LAB
ALBUMIN SERPL ELPH-MCNC: 4.5 G/DL — SIGNIFICANT CHANGE UP (ref 3.3–5)
ALP SERPL-CCNC: 59 U/L — SIGNIFICANT CHANGE UP (ref 40–120)
ALT FLD-CCNC: 44 U/L — HIGH (ref 4–33)
ANION GAP SERPL CALC-SCNC: 13 MMOL/L — SIGNIFICANT CHANGE UP (ref 7–14)
AST SERPL-CCNC: 72 U/L — HIGH (ref 4–32)
BASOPHILS # BLD AUTO: 0.04 K/UL — SIGNIFICANT CHANGE UP (ref 0–0.2)
BASOPHILS NFR BLD AUTO: 0.3 % — SIGNIFICANT CHANGE UP (ref 0–2)
BILIRUB SERPL-MCNC: 0.3 MG/DL — SIGNIFICANT CHANGE UP (ref 0.2–1.2)
BUN SERPL-MCNC: 8 MG/DL — SIGNIFICANT CHANGE UP (ref 7–23)
CALCIUM SERPL-MCNC: 9.7 MG/DL — SIGNIFICANT CHANGE UP (ref 8.4–10.5)
CHLORIDE SERPL-SCNC: 100 MMOL/L — SIGNIFICANT CHANGE UP (ref 98–107)
CO2 SERPL-SCNC: 22 MMOL/L — SIGNIFICANT CHANGE UP (ref 22–31)
CREAT SERPL-MCNC: 0.61 MG/DL — SIGNIFICANT CHANGE UP (ref 0.5–1.3)
EGFR: 121 ML/MIN/1.73M2 — SIGNIFICANT CHANGE UP
EOSINOPHIL # BLD AUTO: 0.04 K/UL — SIGNIFICANT CHANGE UP (ref 0–0.5)
EOSINOPHIL NFR BLD AUTO: 0.3 % — SIGNIFICANT CHANGE UP (ref 0–6)
GLUCOSE SERPL-MCNC: 82 MG/DL — SIGNIFICANT CHANGE UP (ref 70–99)
HCT VFR BLD CALC: 42.1 % — SIGNIFICANT CHANGE UP (ref 34.5–45)
HGB BLD-MCNC: 13.5 G/DL — SIGNIFICANT CHANGE UP (ref 11.5–15.5)
IANC: 9.96 K/UL — HIGH (ref 1.8–7.4)
IMM GRANULOCYTES NFR BLD AUTO: 0.6 % — SIGNIFICANT CHANGE UP (ref 0–0.9)
LYMPHOCYTES # BLD AUTO: 17.4 % — SIGNIFICANT CHANGE UP (ref 13–44)
LYMPHOCYTES # BLD AUTO: 2.27 K/UL — SIGNIFICANT CHANGE UP (ref 1–3.3)
MCHC RBC-ENTMCNC: 25.4 PG — LOW (ref 27–34)
MCHC RBC-ENTMCNC: 32.1 GM/DL — SIGNIFICANT CHANGE UP (ref 32–36)
MCV RBC AUTO: 79.1 FL — LOW (ref 80–100)
MONOCYTES # BLD AUTO: 0.69 K/UL — SIGNIFICANT CHANGE UP (ref 0–0.9)
MONOCYTES NFR BLD AUTO: 5.3 % — SIGNIFICANT CHANGE UP (ref 2–14)
NEUTROPHILS # BLD AUTO: 9.96 K/UL — HIGH (ref 1.8–7.4)
NEUTROPHILS NFR BLD AUTO: 76.1 % — SIGNIFICANT CHANGE UP (ref 43–77)
NRBC # BLD: 0 /100 WBCS — SIGNIFICANT CHANGE UP (ref 0–0)
NRBC # FLD: 0 K/UL — SIGNIFICANT CHANGE UP (ref 0–0)
PLATELET # BLD AUTO: 262 K/UL — SIGNIFICANT CHANGE UP (ref 150–400)
POTASSIUM SERPL-MCNC: SIGNIFICANT CHANGE UP MMOL/L (ref 3.5–5.3)
POTASSIUM SERPL-SCNC: SIGNIFICANT CHANGE UP MMOL/L (ref 3.5–5.3)
PROT SERPL-MCNC: 7.9 G/DL — SIGNIFICANT CHANGE UP (ref 6–8.3)
RBC # BLD: 5.32 M/UL — HIGH (ref 3.8–5.2)
RBC # FLD: 14 % — SIGNIFICANT CHANGE UP (ref 10.3–14.5)
SODIUM SERPL-SCNC: 135 MMOL/L — SIGNIFICANT CHANGE UP (ref 135–145)
TROPONIN T, HIGH SENSITIVITY RESULT: 6 NG/L — SIGNIFICANT CHANGE UP
WBC # BLD: 13.08 K/UL — HIGH (ref 3.8–10.5)
WBC # FLD AUTO: 13.08 K/UL — HIGH (ref 3.8–10.5)

## 2022-11-04 PROCEDURE — 99285 EMERGENCY DEPT VISIT HI MDM: CPT

## 2022-11-04 PROCEDURE — 93010 ELECTROCARDIOGRAM REPORT: CPT

## 2022-11-04 PROCEDURE — 71046 X-RAY EXAM CHEST 2 VIEWS: CPT | Mod: 26

## 2022-11-04 NOTE — ED PROVIDER NOTE - PROGRESS NOTE DETAILS
JESUS SERRANO: potassium hemolyzed. No peaked T waves. Normal renal function. Urinating without any issues. Labs from OSH demonstrated normal K.

## 2022-11-04 NOTE — ED PROVIDER NOTE - ATTENDING WITH...
Warm compresses.  Drawing salve.  Do not squeeze it.    Thank you for choosing [facility].     Please fill out the patient experience survey.  
Resident

## 2022-11-04 NOTE — ED PROVIDER NOTE - ATTENDING CONTRIBUTION TO CARE
Dr. Angeles, Attending Physician-  I performed a face to face bedside interview with patient regarding history of present illness, review of symptoms and past medical history. I completed an independent physical exam.  I have discussed patient's plan of care with the resident.    32F, no pmh, presenting after an episode of chest pain with mild radiation to L arm. Presented to [place] yesterday after a similar episode and pre-syncope. Labs, TTE, CT/MRI head all within normal limits and she was discharged. Here in the ED, reports that her sxs are improving. No changes to exercise tolerance. Not on OCPs/hormonal therapy. Tolerating PO. Stooling/voiding without any issues. No headaches, fevers, chills, cough, sputum, sob, belly pain, nvd, dysuria, hematuria, recent travel, trauma. Physical: afebrile, well appearing, rrr, ctbal, abdomen soft, no le edema, stable gait, 5/5 strength in all four extremities, CN2-12 grossly intact. Plan: EKG here non-ischemic, will draw ACS labs. Offered CDU admission for repeat ACS eval however patient understands that she had extensive work up yesterday and the yield from a repeat workup may be low. Dispo pending. Will refer to outpatient cards/neuro if labs negative.

## 2022-11-04 NOTE — ED PROVIDER NOTE - OBJECTIVE STATEMENT
Patient is a 32y female who presents to the ED with and episode of chest tightness and shortness of breath. Patient states she had a similar episode yesterday and had to pull over from driving. Patient was taken to [facility] where she was evaluated and discharged. Patient had CT scan of head/neck, Ekg, Chest xray, echo which were all within normal limits. Patient had another episode of chest tightness and shortness of breath this morning. Patient came into ED for evaluation. Denies any fever, chills, cough, chest pain, syncope, urinary symptoms, blood in her stool or urine.

## 2022-11-04 NOTE — ED PROVIDER NOTE - NS ED ROS FT
CONSTITUTIONAL: No fevers, no chills, no lightheadedness, no dizziness  EYES: no visual changes, no eye pain  EARS: no ear drainage, no ear pain, no change in hearing  NOSE: no nasal congestion  MOUTH/THROAT: no sore throat  CV: No chest pain, no palpitations, + chest tightness   RESP: + SOB, no cough  GI: No n/v/d, no abd pain  : no dysuria, no hematuria, no flank pain  MSK: no back pain, no extremity pain  SKIN: no rashes  NEURO: no headache, no focal weakness, no decreased sensation/parasthesias   PSYCHIATRIC: no known mental health issues

## 2022-11-04 NOTE — ED ADULT NURSE NOTE - OBJECTIVE STATEMENT
Received patient in Intake 3 c/o chest pain, left arm numbness x 2 days w/blurry vision on left eye today. Patient denies SOB, headache, fever. Patient is A&OX4, airway patent, breathing unlabored and even, radial pulses palpable, abdomen soft, nontender. Labs obtained, 22G IV placed on left wrist, awaiting X-ray. Side rails up and safety maintained. Fall precaution in place.

## 2022-11-04 NOTE — ED ADULT NURSE NOTE - NSIMPLEMENTINTERV_GEN_ALL_ED
Implemented All Fall with Harm Risk Interventions:  [place] to call system. Call bell, personal items and telephone within reach. Instruct patient to call for assistance. Room bathroom lighting operational. Non-slip footwear when patient is off stretcher. Physically safe environment: no spills, clutter or unnecessary equipment. Stretcher in lowest position, wheels locked, appropriate side rails in place. Provide visual cue, wrist band, yellow gown, etc. Monitor gait and stability. Monitor for mental status changes and reorient to person, place, and time. Review medications for side effects contributing to fall risk. Reinforce activity limits and safety measures with patient and family. Provide visual clues: red socks.

## 2022-11-04 NOTE — ED ADULT NURSE NOTE - CHIEF COMPLAINT QUOTE
Pt c/o chest pain,  and L arm numbness x 2 days.  Pt seen at [facility] yesterday,  where CT and MRI  was done for numbness to L side of head   Pt reports blurry vision to L eye en route to hospital

## 2022-11-04 NOTE — ED PROVIDER NOTE - PHYSICAL EXAMINATION
Physical Exam:  Gen: NAD, AOx3, non-toxic appearing, able to ambulate without assistance  Head: NCAT  HEENT: EOMI, PEERLA, normal conjunctiva, tongue midline, oral mucosa moist  Lung: CTAB, no respiratory distress, no wheezes/rhonchi/rales B/L, speaking in full sentences  CV: RRR, no murmurs, rubs or gallops  Abd: soft, NT, ND, no guarding, no rigidity, no rebound tenderness, no CVA tenderness   MSK: no visible deformities, ROM normal in UE/LE, no back pain  Neuro: No focal sensory or motor deficits, full cranial nerve exam normal   Skin: Warm, well perfused, no rash, no leg swelling  Psych: normal affect, calm

## 2022-11-04 NOTE — ED ADULT TRIAGE NOTE - CHIEF COMPLAINT QUOTE
Pt c/o chest pain,  and L arm numbness x 2 days.  Pt seen at [facility] yesterday,  where CT and MRI  was done for numbness to L side of head   Pt reports blurry vision to L eye en route to hospital

## 2022-11-04 NOTE — ED PROVIDER NOTE - CLINICAL SUMMARY MEDICAL DECISION MAKING FREE TEXT BOX
Patient presents to the ED with shortness of breath and chest tightness. Patient was recently evaluated in an outside ED. patient had a full workup done including chest xray, troponin' s, ECHO, MRI, CT scan which were all within normal limits. Patient presenting again with similar symptoms. We will obtain basic labs and cardiac workup to rule out any acute cardiac pathologies. Shared decision made with patient if all results are negative- okay with dc and follow up closely with neurology.

## 2022-11-04 NOTE — ED PROVIDER NOTE - PATIENT PORTAL LINK FT
You can access the FollowMyHealth Patient Portal offered by [facility] by registering at the following website: [email or web address]. By joining [employer]’s FollowMyHealth portal, you will also be able to view your health information using other applications (apps) compatible with our system.
0 = independent

## 2022-11-08 ENCOUNTER — APPOINTMENT (OUTPATIENT)
Dept: INTERNAL MEDICINE | Facility: CLINIC | Age: 32
End: 2022-11-08

## 2022-11-08 ENCOUNTER — APPOINTMENT (OUTPATIENT)
Dept: PULMONOLOGY | Facility: CLINIC | Age: 32
End: 2022-11-08
Payer: MEDICAID

## 2022-11-08 VITALS
BODY MASS INDEX: 23.05 KG/M2 | OXYGEN SATURATION: 99 % | HEIGHT: 64 IN | TEMPERATURE: 98.6 F | DIASTOLIC BLOOD PRESSURE: 74 MMHG | SYSTOLIC BLOOD PRESSURE: 112 MMHG | HEART RATE: 98 BPM | WEIGHT: 135 LBS

## 2022-11-08 VITALS
HEIGHT: 64 IN | OXYGEN SATURATION: 99 % | WEIGHT: 135 LBS | RESPIRATION RATE: 16 BRPM | BODY MASS INDEX: 23.05 KG/M2 | DIASTOLIC BLOOD PRESSURE: 62 MMHG | HEART RATE: 96 BPM | TEMPERATURE: 99.2 F | SYSTOLIC BLOOD PRESSURE: 120 MMHG

## 2022-11-08 DIAGNOSIS — R06.02 SHORTNESS OF BREATH: ICD-10-CM

## 2022-11-08 DIAGNOSIS — R05.9 COUGH, UNSPECIFIED: ICD-10-CM

## 2022-11-08 DIAGNOSIS — Z87.898 PERSONAL HISTORY OF OTHER SPECIFIED CONDITIONS: ICD-10-CM

## 2022-11-08 DIAGNOSIS — K21.9 GASTRO-ESOPHAGEAL REFLUX DISEASE W/OUT ESOPHAGITIS: ICD-10-CM

## 2022-11-08 DIAGNOSIS — J45.909 UNSPECIFIED ASTHMA, UNCOMPLICATED: ICD-10-CM

## 2022-11-08 PROCEDURE — 99203 OFFICE O/P NEW LOW 30 MIN: CPT | Mod: 25

## 2022-11-08 PROCEDURE — 94727 GAS DIL/WSHOT DETER LNG VOL: CPT

## 2022-11-08 PROCEDURE — 99215 OFFICE O/P EST HI 40 MIN: CPT | Mod: 25

## 2022-11-08 PROCEDURE — 36415 COLL VENOUS BLD VENIPUNCTURE: CPT

## 2022-11-08 PROCEDURE — 94729 DIFFUSING CAPACITY: CPT

## 2022-11-08 PROCEDURE — 94060 EVALUATION OF WHEEZING: CPT

## 2022-11-09 ENCOUNTER — NON-APPOINTMENT (OUTPATIENT)
Age: 32
End: 2022-11-09

## 2022-11-09 ENCOUNTER — APPOINTMENT (OUTPATIENT)
Dept: ORTHOPEDIC SURGERY | Facility: CLINIC | Age: 32
End: 2022-11-09

## 2022-11-09 PROBLEM — Z87.898 HISTORY OF CHEST PAIN: Status: RESOLVED | Noted: 2021-07-21 | Resolved: 2021-11-19

## 2022-11-09 PROCEDURE — 73110 X-RAY EXAM OF WRIST: CPT | Mod: LT

## 2022-11-09 PROCEDURE — 99203 OFFICE O/P NEW LOW 30 MIN: CPT

## 2022-11-09 RX ORDER — PREDNISONE 10 MG/1
10 TABLET ORAL
Qty: 21 | Refills: 0 | Status: DISCONTINUED | COMMUNITY
Start: 2022-10-27 | End: 2022-11-09

## 2022-11-09 NOTE — HEALTH RISK ASSESSMENT
[0] : 2) Feeling down, depressed, or hopeless: Not at all (0) [PHQ-2 Negative - No further assessment needed] : PHQ-2 Negative - No further assessment needed [[id number]] : 0

## 2022-11-09 NOTE — PHYSICAL EXAM
[No Acute Distress] : no acute distress [Well-Appearing] : well-appearing [Normal Sclera/Conjunctiva] : normal sclera/conjunctiva [PERRL] : pupils equal round and reactive to light [EOMI] : extraocular movements intact [Normal Outer Ear/Nose] : the outer ears and nose were normal in appearance [Normal Nasal Mucosa] : the nasal mucosa was normal [No Lymphadenopathy] : no lymphadenopathy [Supple] : supple [No Respiratory Distress] : no respiratory distress  [Clear to Auscultation] : lungs were clear to auscultation bilaterally [Normal Rate] : normal rate  [Regular Rhythm] : with a regular rhythm [Normal S1, S2] : normal S1 and S2 [No Murmur] : no murmur heard [No Edema] : there was no peripheral edema [Soft] : abdomen soft [Non Tender] : non-tender [No HSM] : no HSM [Normal Supraclavicular Nodes] : no supraclavicular lymphadenopathy [Normal Posterior Cervical Nodes] : no posterior cervical lymphadenopathy [Normal Anterior Cervical Nodes] : no anterior cervical lymphadenopathy [No CVA Tenderness] : no CVA  tenderness [No Spinal Tenderness] : no spinal tenderness [No Joint Swelling] : no joint swelling [Grossly Normal Strength/Tone] : grossly normal strength/tone [No Rash] : no rash [No Focal Deficits] : no focal deficits [Normal Gait] : normal gait [Normal Affect] : the affect was normal [Alert and Oriented x3] : oriented to person, place, and time [No Accessory Muscle Use] : no accessory muscle use [Pedal Pulses Present] : the pedal pulses are present [Deep Tendon Reflexes (DTR)] : deep tendon reflexes were 2+ and symmetric [de-identified] : +cobblestoning, no pharyngeal exudate or erythema; no sinus tenderness [de-identified] : no chest tenderness [de-identified] : left upper back: kyle scapular tenderness, no rash or lesions; left hand: min bruising at thenar eminence with mild diffuse tenderness with deep palpation, no skin breakdown [de-identified] : motor 5/5 b/l UEs, normal  b/l; negative Phalen's/Tinel's; sensation grossly intact

## 2022-11-09 NOTE — ASSESSMENT
[FreeTextEntry1] : \par 33 yo F pmhx seasonal allergies, HLD, GERD, IBS, s/p ER visits 11/22 with left arm numbness a/w sob and lightheadedness here for f/u\par \par \par hx cough, hx transient sob/GOMEZ- s/p UC 10/3 with dx acute sinusitis s/p Amoxicillin course with subsequent cough onset s/p repeat UC visit 10/27 dx'd bronchitis with MDI/Tessalon/Prednisone given and slowly improving sx's since.  S/P ER visits 11/22 with negative w/u.  Well appearing, VSS  O2 sat 97 % (at rest) --> 96% (on ambulation).\par -11/22 (ER) d-dimer 0.22\par -11/22 (ER) cxr: no evidence of active pulmonary disease\par -11/22 (ER) cxr: no evidence of active pulmonary disease\par -11/22 (ER) cbc wnl, except wbc 13; cmp wnl, except K hi (+hemolysis), AST 72 ALT 44\par -pulm appt pending today, encouraged\par -encouraged use of Flonase and Tessalon prn\par -advised prompt medical eval if sx's worsen or new sx's arise\par -check cbc/cmp\par \par left arm numbness, left upper back pain- hx left hand trauma 1 week prior to onset, suspect a/w strain related to awkward position held at work when back pain onset first noted; nonfocal exam\par -check xray: thoracic spine, left shoulder/scapula, left hand\par -trial of Flexeril, risks/benefits/SEs discussed\par -advised OTC Tylenol and Motrin with food prn\par -ortho eval advised, infor for fast track scheduling given for assistance\par -awaiting neurology appt 12/9/22\par -avoidance of strenuous activity pending ortho evaluation advised\par \par IBS-\par -followed by GI, on med regimen - adherence encouraged\par \par \par HCM\par -hx CPE 11/21, yearly advised\par \par \par Pt has prior scheduled office appt 12/9/22 for CPE and fasting labs. Advised f/u sooner as needed.\par \par Labs drawn in office today.\par

## 2022-11-09 NOTE — DATA REVIEWED
[FreeTextEntry1] : [facility] - to be scanned into chart\par \par 11/4/22\par cbc wnl, except wbc 13\par cmp wnl, except K hi (+hemolysis), AST 72 ALT 44\par Trop 6\par \par cxr: clear lungs\par \par outside records ([facility])- to be scanned into chart\par 11/3/22\par cbc wnl\par cmp wnl, except ALT 49 (nl 7-40)\par Tchol 146  LDL 70 HDL 52\par Trop 8, 54, 42 (nl 3-34)\par CPK 42\par TSH 1.52\par PT/PTT/INR wnl\par HCG negative\par Mg 1.9\par d-dimer 0.22\par ESR 5\par \par CT head: no acute intracranial hemorrhage, mass effect or midline shift.\par \par MRA brain: no large vessel occlusion or hemodynamically significant stenosis.\par \par MRA neck: no large vessel occlusion or hemodynamically significant stenosis.\par \par MRI brain +/- contrast:  no acute intracranial hemorrhage, mass effect or midline shift.\par \par cxr: no evidence of active pulmonary disease\par \par 2D echo: EF 60-65%, nl LV/RV function and size\par

## 2022-11-09 NOTE — HISTORY OF PRESENT ILLNESS
[FreeTextEntry1] : \par f/u - s/p ER visit [de-identified] : \par Accompanied by \par \par 33 yo F pmhx seasonal allergies, HLD, GERD, IBS for s/p ER visit\par \par Last seen in office 10/31/22 c/o cough- reported s/p UC 10/3 with sinus sx's dx acute sinusitis given Amoxicillin course with subsequent cough a/w CP for which seen again at  10/27 when dx'd bronchitis with MDI/Tessalon/Prednisone given and stable sx's since reported.\par -RVP done, found negative\par -cxr ordered, pending\par -advised Flonase prn, Claritin prn and Tessalon prn\par -advised famotidine trial for possible contribution of reflux to cough given recent prednisone use\par \par Is s/p ER visits- \par States on 11/3 woke up with gen left arm numbness, thought related to having slept on it.  On morning drive to work had acute sharp pain at left upper back, then felt lightheaded so pulled car over.  Then felt sob onset.  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  Notes also reported having CP on/off since last office visit after coughs.  \par -had EKG, labs (told slightly elevated tronopin, nonspecific), cxr, CT head, MRI/A brain/neck- told all normal.\par -echo done, told cardiology and was normal.\par -given ASA in ER, states no other meds given.  Overall better while in ER - only left arm numbness was still there.  D/c'd home, no meds given.  Advised f/u with PMD.  \par \par On 11/4 on awakening had onset of sob, was not feeling nervous. Still had left arm numbness, went to [facility] ER\par -had EKG, told nl\par -had labs, thinks was normal\par -had cxr, told  normal\par -no meds given\par -states sob resolved while in ER, no meds given.  Arm numbness persisted.  D/c'd home w/o new medication.\par \par States left arm numbness has been on/off, onset mainly if lays on that side\par feels arm is weak, but able to do activities\par is right handed\par \par Recalls hx trauma to her left hand 1 week prior to onset of arm numbness.  Was removing back splash from kitchen and hit hand below thumb with hammer, was bruised/swollen and it slowly got less.  Area is  to touch, getting less.  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  \par Notes no relation to CP or sob reported prior to MDI use.\par -has pulm appt later today\par -not taking Tessalon (never picked up), on Flonase on/off\par \par hx CP- getting less, last this morning, was minimal after coughed\par \par ? min GOMEZ  on/off, +sob if lays flat since ER visit, goes away if turns onto right side, using 3 pillows for support for back pain\par Denies leg swelling, fever or chills.\par \par hx IBS- on meds per GI, states not taking meds regularly recently as felt unwell as was not sure if contributing to sx's- last  taken 11/4, having GI sx's that are typical for her IBS since off meds.  \par mainly diarrhea, going  1- 2x/d, no blood or mucus\par Denies abdominal pain, n/v\par min low appetite, but keeping up with po intake\par \par Denies  complaints.\par \par Denies depression or anxiety.\par \par

## 2022-11-09 NOTE — REVIEW OF SYSTEMS
[Postnasal Drip] : postnasal drip [Negative] : Integumentary [Earache] : no earache [Nasal Discharge] : no nasal discharge [Sore Throat] : no sore throat [FreeTextEntry5] : see HPI [FreeTextEntry6] : see HPI [FreeTextEntry7] : see HPI [FreeTextEntry9] : see HPI [de-identified] : see HPI

## 2022-11-10 ENCOUNTER — APPOINTMENT (OUTPATIENT)
Dept: ORTHOPEDIC SURGERY | Facility: CLINIC | Age: 32
End: 2022-11-10

## 2022-11-10 VITALS
BODY MASS INDEX: 23.05 KG/M2 | HEIGHT: 64 IN | TEMPERATURE: 98 F | HEART RATE: 98 BPM | OXYGEN SATURATION: 99 % | WEIGHT: 135 LBS | DIASTOLIC BLOOD PRESSURE: 74 MMHG | SYSTOLIC BLOOD PRESSURE: 112 MMHG

## 2022-11-10 LAB
ALBUMIN SERPL ELPH-MCNC: 4.9 G/DL
ALP BLD-CCNC: 71 U/L
ALT SERPL-CCNC: 20 U/L
ANION GAP SERPL CALC-SCNC: 12 MMOL/L
AST SERPL-CCNC: 14 U/L
BASOPHILS # BLD AUTO: 0.04 K/UL
BASOPHILS NFR BLD AUTO: 0.3 %
BILIRUB SERPL-MCNC: 0.2 MG/DL
BUN SERPL-MCNC: 12 MG/DL
CALCIUM SERPL-MCNC: 9.9 MG/DL
CHLORIDE SERPL-SCNC: 103 MMOL/L
CO2 SERPL-SCNC: 23 MMOL/L
CREAT SERPL-MCNC: 0.77 MG/DL
EGFR: 104 ML/MIN/1.73M2
EOSINOPHIL # BLD AUTO: 0.02 K/UL
EOSINOPHIL NFR BLD AUTO: 0.2 %
GLUCOSE SERPL-MCNC: 113 MG/DL
HCT VFR BLD CALC: 43.6 %
HGB BLD-MCNC: 13.3 G/DL
IMM GRANULOCYTES NFR BLD AUTO: 0.3 %
LYMPHOCYTES # BLD AUTO: 2.03 K/UL
LYMPHOCYTES NFR BLD AUTO: 17.1 %
MAN DIFF?: NORMAL
MCHC RBC-ENTMCNC: 25.5 PG
MCHC RBC-ENTMCNC: 30.5 GM/DL
MCV RBC AUTO: 83.7 FL
MONOCYTES # BLD AUTO: 0.61 K/UL
MONOCYTES NFR BLD AUTO: 5.1 %
NEUTROPHILS # BLD AUTO: 9.11 K/UL
NEUTROPHILS NFR BLD AUTO: 77 %
PLATELET # BLD AUTO: 243 K/UL
POTASSIUM SERPL-SCNC: 4.4 MMOL/L
PROT SERPL-MCNC: 7.3 G/DL
RBC # BLD: 5.21 M/UL
RBC # FLD: 14.2 %
SODIUM SERPL-SCNC: 138 MMOL/L
WBC # FLD AUTO: 11.85 K/UL

## 2022-11-10 PROCEDURE — 72040 X-RAY EXAM NECK SPINE 2-3 VW: CPT

## 2022-11-10 PROCEDURE — 99215 OFFICE O/P EST HI 40 MIN: CPT

## 2022-11-10 NOTE — DISCUSSION/SUMMARY
[Medication Risks Reviewed] : Medication risks reviewed [de-identified] : We discussed daily activities she needs to avoid which may aggravate the symptoms.  She will use moist heat.  In light of her recently resolving heartburn she has been started on ibuprofen but only 600 mg 3 times a day.  If she is tolerating it and has not seen improvement in a week she can increase the dose of 800 mg 3 times a day.  She will call if there are problems with the medication or worsening of her symptoms.  She will continue the medicine for 3 to 4 days after he is fully better.  I will see her for follow-up in 3 weeks on a as needed basis.

## 2022-11-10 NOTE — PHYSICAL EXAM
[de-identified] : She is fully alert and oriented with a normal mood and affect.  She is in no acute distress as I take the history.  She ambulates with a normal gait including tiptoe and heel walking.  There is no evidence of unsteadiness or spasticity.  Cutaneous examination of the spine reveals a small transverse cervical thoracic incision from removal of a very prior lipoma.  There is no evidence of shortness of breath or respiratory distress.  There is no paravertebral muscle spasm, sciatic notch tenderness or trochanteric tenderness.  Forward flexion of the spine is painless and there is no evidence of a spinal deformity.  Her lower extremity neurological examination revealed 2-3+ symmetrical reflexes.  Toes are downgoing.  Motor power is normal to manual testing in all lower extremity groups and sensation is normal to light touch in all dermatomes.  Straight leg raising is negative to 90 degrees in the sitting position bilaterally.  Her hips and her knees have a full and painless range of motion with normal stability.  Vascular examination shows no evidence of significant varicosities and there is no lymphedema.  There are no cutaneous abnormalities of the upper or lower extremities.  Her upper extremity neurological examination revealed 2+ symmetrical reflexes.  She has a negative Margarette's bilaterally.  Motor power is normal in manual testing in all upper extremity groups.  There may be some decrease sensation to light touch in the left upper extremity in a C5 nerve root distribution.  Shoulder range of motion is full, painless and symmetrical.  Range of motion of the cervical spine shows flexion with the chin reaching to within 2 fingerbreadths of the chest, extension of 80 degrees with rotation of 80 degrees bilaterally and tilt of 30 degrees bilaterally with some mild discomfort at the extremes. [de-identified] : In light of her complaints AP and lateral x-rays of the cervical spine are obtained.  There is a straightening of the normal cervical lordosis.  Disc heights are well-maintained.  Sagittal alignment is otherwise normal and there are no destructive changes.  She does have a mild scoliosis.

## 2022-11-10 NOTE — HISTORY OF PRESENT ILLNESS
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  She has some burning in her left neck.  The symptoms that are week ago are graded as a 9 or 10 and now graded as a 5.  She denies a history of prior neck problems.  Treatment has been Tylenol and ibuprofen over-the-counter 400 mg twice a day.  There have been no changes in her gait or balance.  There is no Valsalva or affect.  Her past medical history is positive only for diarrhea type irritable bowel syndrome.  She has had reflux symptoms and was only recently started on Nexium which seems to be working.  She had prior excision of a lipoma at the cervicothoracic junction. [Pain Location] : pain [5] : a maximum pain level of 5/10

## 2022-11-11 ENCOUNTER — APPOINTMENT (OUTPATIENT)
Dept: INTERNAL MEDICINE | Facility: CLINIC | Age: 32
End: 2022-11-11

## 2022-11-15 ENCOUNTER — NON-APPOINTMENT (OUTPATIENT)
Age: 32
End: 2022-11-15

## 2022-11-16 ENCOUNTER — APPOINTMENT (OUTPATIENT)
Dept: ORTHOPEDIC SURGERY | Facility: CLINIC | Age: 32
End: 2022-11-16

## 2022-11-17 ENCOUNTER — NON-APPOINTMENT (OUTPATIENT)
Age: 32
End: 2022-11-17

## 2022-11-21 ENCOUNTER — APPOINTMENT (OUTPATIENT)
Dept: INTERNAL MEDICINE | Facility: CLINIC | Age: 32
End: 2022-11-21

## 2022-11-21 ENCOUNTER — APPOINTMENT (OUTPATIENT)
Dept: ORTHOPEDIC SURGERY | Facility: CLINIC | Age: 32
End: 2022-11-21

## 2022-11-21 ENCOUNTER — TRANSCRIPTION ENCOUNTER (OUTPATIENT)
Age: 32
End: 2022-11-21

## 2022-11-21 VITALS
WEIGHT: 130 LBS | HEIGHT: 64 IN | SYSTOLIC BLOOD PRESSURE: 108 MMHG | OXYGEN SATURATION: 98 % | TEMPERATURE: 98.9 F | DIASTOLIC BLOOD PRESSURE: 62 MMHG | BODY MASS INDEX: 22.2 KG/M2 | HEART RATE: 88 BPM | RESPIRATION RATE: 16 BRPM

## 2022-11-21 VITALS
OXYGEN SATURATION: 98 % | DIASTOLIC BLOOD PRESSURE: 76 MMHG | HEIGHT: 64 IN | WEIGHT: 130 LBS | HEART RATE: 100 BPM | BODY MASS INDEX: 22.2 KG/M2 | SYSTOLIC BLOOD PRESSURE: 120 MMHG

## 2022-11-21 DIAGNOSIS — M54.9 DORSALGIA, UNSPECIFIED: ICD-10-CM

## 2022-11-21 DIAGNOSIS — M79.642 PAIN IN LEFT HAND: ICD-10-CM

## 2022-11-21 PROCEDURE — 99214 OFFICE O/P EST MOD 30 MIN: CPT

## 2022-11-21 PROCEDURE — 99215 OFFICE O/P EST HI 40 MIN: CPT

## 2022-11-21 NOTE — PHYSICAL EXAM
[de-identified] : Cervical Physical Exam\par \par Gait - Normal\par \par Station - Normal\par \par Sagittal balance - Normal \par \par Compensatory mechanism? - None\par \par Horizontal gaze - Maintained\par \par Heel walk - Normal\par \par Toe walk - Normal\par \par Reflexes\par Biceps - Normal\par Triceps - Normal\par Brachioradialis - Normal\par Patellar - Normal\par Gastroc - Normal\par Clonus -No\par \par Alcaraz´s - None\par \par Shoulder Exam - Normal\par \par Spurling´s - None\par \par Wrist Pulses -2+ radial/ulnar\par \par Foot Pulses -2+ DP/PT\par \par Cervical range of motion - Normal\par \par Sensation \par C5-T1 sensation intact to light touch bilaterally, diffuse numbness over the left C6 and 7 and C8 distributions\par \par L1-S1 sensation intact to light touch bilaterally\par \par Motor\par \par \par 	Deltoid	Biceps	Triceps	WF	WE	IO	\par Right	5/5	5/5	5/5	5/5	5/5	5/5	5/5\par Left	4/5	4/5	3+/5	5/5	5/5	5/5	5/5\par \par \par 	IP	Quad	HS	TA	Gastroc	EHL\par Right	5/5	5/5	5/5	5/5	5/5	5/5\par Left	5/5	5/5	5/5	5/5	5/5	5/5 [de-identified] : Cervical radiographs reviewed from earlier November\par No significant facet arthropathy\par Disc heights relatively well-maintained

## 2022-11-21 NOTE — ASSESSMENT
[FreeTextEntry1] : I had a lengthy discussion with the patient in regards to their treatment plan and diagnosis.  They do have objective weakness findings on my exam.  Their symptoms have persisted despite the conservative management they have attempted thus far.  As a result I would like to proceed with a lumbar MRI.  In tandem with this they should begin physical therapy/home therapy program.  The patient can take Tylenol/NSAIDs as needed for pain control if medically able to.  I will have the patient follow-up in 3 to 4 weeks for repeat clinical evaluation.  I encouraged them to follow-up sooner if their symptoms worsen or change in any way.\par \par The patient has tried the following treatments:\par Activity modification          +\par Ice/Compression                  +\par Braces                                     -\par NSAIDS                                   +\par Physical Therapy                   +\par \par Please note these treatment modalities been tried for over 6+ weeks over the past 2 months

## 2022-11-21 NOTE — HISTORY OF PRESENT ILLNESS
[de-identified] : Patient is a 32-year-old female here today for evaluation treatment of her neck and arm symptoms.  She has been dealing with the symptoms for approximately 3 weeks.  She does state that she has pain which does radiate over her left posterior shoulder into her shoulder blade.  She also describes numbness and tingling down her left arm.  She denies any bowel bladder issues.  She denies any saddle anesthesia.  She does state that the symptoms have been bad enough that she has gone to the ER twice.  They worked her up for both respiratory and neurologic issues including a brain MRI which thankfully came back negative.

## 2022-11-22 NOTE — HISTORY OF PRESENT ILLNESS
[FreeTextEntry1] : \par f/u\par  [de-identified] : \par Accompanied by \par \par 31 yo F pmhx seasonal allergies, HLD, GERD, IBS for s/p ER visit\par \par Last seen 11/8/22 s/p UC and ER visits.\par -hx cough, hx transient sob/GOMEZ- s/p UC 10/3 with dx acute sinusitis s/p Amoxicillin course with subsequent cough onset s/p repeat UC visit 10/27 dx'd bronchitis with MDI/Tessalon/Prednisone given and slowly improving sx's since. S/P ER visits 11/22 with negative w/u.\par Also c/o left arm and hand pain.\par -xrays ordered, pending\par -referred to ortho and pulm for further management \par -labs done\par  \par \par Reports is s/p ER x2 since last visit.\par \par Hx ER 11/17/22 at [facility] for trouble swallowing and starting feeling anxious as reslt\par -given Maalox with help while there\par -had labs\par -advised d/c ibuprofen had been taking prior to visit and use Pantoprazole (had at home)\par \par Hx ER at [facility] 11/20/22 for recurrence of left upper back pain, no better with Tylenol use and was getting nervous as was home alone.  Also reported reflux sx's and sore throat.\par -states had viral panel and strep throat swab - told both negative\par -given Flexeril in ER, felt helped significantly.  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  Referred to see ortho, Dr. Rodriguez.  EMG ordered.\par -seen by ortho, Dr. Rodriguez 11/22, with xray c-spine done, noted mild scoliosis and straightening.  Advised NSAIDs OTC, heat and f/u in 3 weeks.\par -awaiting neurology appt 12/9/22\par \par hx left hand pain s/p trauma to her left hand 1 week prior to onset of arm numbness- feels is getting less \par h removing back splash from kitchen and hit hand below thumb with hammer, was bruised/swollen and it slowly got less. \par Denies skin breakdown, joint swelling or redness.\par \par States had been taking 600mg TID per ortho x 1 week, felt no change in sx's.  \par Never started Flexeril given at last visit.\par \par States saw another ortho today for 2nd opinion - advised cervical MRI (pending), given Medrol garry and diclofenac (use pending), Tylenol, PT and home exercise.  Advised to cont Flexeril and f/u 2-3 weeks\par \par +reflux sx's a/w trouble with swallowing x 2 weeks\par +reflux worse with laying flat\par +occ throat discomfort/dysphagia on/off, solids only if has liquid has no issue\par Denies recent aggravating foods or meals near bedtime.\par \par on pantoprazole 40 mg qAM x 1 week, no clear change noted with use\par on famotidine  20 qhs x few days\par \par Hx cough getting - feels near resolved\par no MDI used - notes felt tremors/nervous with use so avoids, last used 11/4.\par not taking Tessalon, on Flonase on/off  \par -seen by pulm 11/22 with PFTs done, given prednisone course (states never took) and sample inhaler (? name) which took x1 then stopped as made her tremulous.  F/U pending\par \par hx CP onset after coughing- states has resolved.\par \par hx IBS- on meds per GI, states not taking meds regularly recently as felt unwell as was not sure if contributing to sx's- last  taken 11/4, having GI sx's that are typical for her IBS since off meds.  \par mainly diarrhea, going  1- 2x/d, no blood or mucus\par Denies abdominal pain or n/v\par min low appetite, but keeping up with po intake\par \par Denies  complaints.\par \par Denies depression, notes +anxiety recently mainly 2/2 on-going medical issues\par \par

## 2022-11-22 NOTE — REVIEW OF SYSTEMS
[Negative] : Integumentary [FreeTextEntry4] : see HPI [FreeTextEntry5] : see HPI [FreeTextEntry7] : see HPI [FreeTextEntry9] : see HPI [de-identified] : see HPI [de-identified] : see HPI

## 2022-11-22 NOTE — ASSESSMENT
[FreeTextEntry1] : \par 31 yo F pmhx seasonal allergies, HLD, GERD, IBS, s/p ER visits 11/22 with left arm numbness a/w sob and lightheadedness here for f/u\par \par \par hx cough, hx transient sob/GOMEZ- s/p UC 10/3 with dx acute sinusitis s/p Amoxicillin course with subsequent cough onset s/p repeat UC visit 10/27 dx'd bronchitis with MDI/Tessalon/Prednisone given and slowly improving sx's since.  S/P ER visits 11/22 with negative w/u.  Well appearing, VSS  O2 sat 97 % (at rest) --> 96% (on ambulation).\par -11/22 (ER) d-dimer 0.22\par -11/22 (ER) cxr: no evidence of active pulmonary disease\par -11/22 (ER) cxr: no evidence of active pulmonary disease\par -11/22 (ER) cbc wnl, except wbc 13; cmp wnl, except K hi (+hemolysis), AST 72 ALT 44\par -11/22 cbc wnl, except wbc 11.8 \par -11/22 cmp wnl\par -11/22 (ER) cbc wnl, except Hg 11.7 (+menses); bmp wnl\par -seen by pulm 11/22 with PFTs done, given prednisone course (states never took) and sample inhaler (? name) which took x1 then stopped as made her tremulous.  F/U pending\par -on Flonase and Tessalon prn\par -advised prompt medical eval if sx's worsen or new sx's arise\par \par left arm numbness, left upper back pain- hx left hand trauma 1 week prior to onset, suspect a/w strain related to awkward position held at work when back pain onset first noted; nonfocal exam\par -did not get xrays ordered prior, as done with ortho instead\par -advised use of Flexeril, risks/benefits/SEs discussed\par -advised OTC Tylenol and Motrin with food prn\par -seen by hand ortho 11/22 thought cervical radiculopathy vs CTS.  Referred to see ortho, Dr. Rodriguez.  EMG ordered.\par -seen by Dr. Rodriguez 11/22, with xray c-spine done, noted mild scoliosis and straightening.  Advised OTC NSAIDs, heat and f/u in 3 weeks- does not plan to return to office.\par -saw another ortho today for 2nd opinion - advised cervical MRI (pending), given Medrol garry and diclofenac (use pending), Tylenol, PT and home exercise. Advised to cont Flexeril and f/u pending 12/7/22\par -awaiting neurology appt 12/9/22\par -avoidance of strenuous activity pending ortho evaluation advised\par \par IBS, GERD- recent reflux a/w throat discomfort and dysphagia on/off concurrent with NSAID use\par -followed by GI, on med regimen - adherence encouraged.  Advised to f/u re: optimal reflux tx course as is planned to be on anti-inflammatories and prednisne per ortho.\par -advised PPI BID, cont. famotidine qhs\par -advised to avoid NSAIDs/medrol pending GI eval.  Advised Tylenol OTC, topicals prn for pain.\par \par anxiety- 2/2 on-going health issues\par -support provided\par -will hold off on as needed medication for now given plans to take Flexeril regularly and concurrent sedation risk\par -BH referral given, office staff asked to assist\par -relaxation techniques counseled\par \par \par HCM\par -hx CPE 11/21, yearly advised\par \par \par

## 2022-11-22 NOTE — DATA REVIEWED
[FreeTextEntry1] : 11/8/22 labs reviewed\par \par 11/17/22 at [facility]\par cbc wnl, except Hg 11.7\par bmp wnl\par influenza pcr negative\par covid negative\par urine pregnancy negative\par \par cxr: unremarkable\par \par \par \par

## 2022-11-22 NOTE — PHYSICAL EXAM
[No Acute Distress] : no acute distress [Well-Appearing] : well-appearing [Normal Sclera/Conjunctiva] : normal sclera/conjunctiva [PERRL] : pupils equal round and reactive to light [EOMI] : extraocular movements intact [Normal Outer Ear/Nose] : the outer ears and nose were normal in appearance [Normal Nasal Mucosa] : the nasal mucosa was normal [No Lymphadenopathy] : no lymphadenopathy [Supple] : supple [No Respiratory Distress] : no respiratory distress  [No Accessory Muscle Use] : no accessory muscle use [Normal Rate] : normal rate  [Clear to Auscultation] : lungs were clear to auscultation bilaterally [Regular Rhythm] : with a regular rhythm [Normal S1, S2] : normal S1 and S2 [No Murmur] : no murmur heard [Pedal Pulses Present] : the pedal pulses are present [No Edema] : there was no peripheral edema [Soft] : abdomen soft [Non Tender] : non-tender [No HSM] : no HSM [Normal Supraclavicular Nodes] : no supraclavicular lymphadenopathy [Normal Posterior Cervical Nodes] : no posterior cervical lymphadenopathy [Normal Anterior Cervical Nodes] : no anterior cervical lymphadenopathy [No CVA Tenderness] : no CVA  tenderness [No Spinal Tenderness] : no spinal tenderness [No Joint Swelling] : no joint swelling [Grossly Normal Strength/Tone] : grossly normal strength/tone [No Focal Deficits] : no focal deficits [No Rash] : no rash [Normal Gait] : normal gait [Deep Tendon Reflexes (DTR)] : deep tendon reflexes were 2+ and symmetric [Normal Affect] : the affect was normal [Alert and Oriented x3] : oriented to person, place, and time [de-identified] : +cobblestoning, no pharyngeal exudate or erythema; no sinus tenderness [de-identified] : no chest tenderness [de-identified] : left upper back: kyle scapular tenderness, no rash or lesions; left hand: min bruising at thenar eminence with min diffuse tenderness with deep palpation, no skin breakdown [de-identified] : motor 5/5 b/l UEs, normal  b/l; negative Phalen's/Tinel's; sensation grossly intact

## 2022-11-23 ENCOUNTER — NON-APPOINTMENT (OUTPATIENT)
Age: 32
End: 2022-11-23

## 2022-11-23 ENCOUNTER — OUTPATIENT (OUTPATIENT)
Dept: OUTPATIENT SERVICES | Facility: HOSPITAL | Age: 32
LOS: 1 days | End: 2022-11-23
Payer: MEDICAID

## 2022-11-23 ENCOUNTER — APPOINTMENT (OUTPATIENT)
Dept: MRI IMAGING | Facility: IMAGING CENTER | Age: 32
End: 2022-11-23

## 2022-11-23 DIAGNOSIS — M54.2 CERVICALGIA: ICD-10-CM

## 2022-11-23 PROCEDURE — 72141 MRI NECK SPINE W/O DYE: CPT

## 2022-11-23 PROCEDURE — 72141 MRI NECK SPINE W/O DYE: CPT | Mod: 26

## 2022-11-30 ENCOUNTER — NON-APPOINTMENT (OUTPATIENT)
Age: 32
End: 2022-11-30

## 2022-11-30 ENCOUNTER — TRANSCRIPTION ENCOUNTER (OUTPATIENT)
Age: 32
End: 2022-11-30

## 2022-12-01 ENCOUNTER — APPOINTMENT (OUTPATIENT)
Dept: INTERNAL MEDICINE | Facility: CLINIC | Age: 32
End: 2022-12-01

## 2022-12-06 ENCOUNTER — NON-APPOINTMENT (OUTPATIENT)
Age: 32
End: 2022-12-06

## 2022-12-07 ENCOUNTER — APPOINTMENT (OUTPATIENT)
Dept: ORTHOPEDIC SURGERY | Facility: CLINIC | Age: 32
End: 2022-12-07

## 2022-12-07 VITALS
DIASTOLIC BLOOD PRESSURE: 65 MMHG | HEIGHT: 64 IN | WEIGHT: 130 LBS | BODY MASS INDEX: 22.2 KG/M2 | SYSTOLIC BLOOD PRESSURE: 114 MMHG

## 2022-12-07 DIAGNOSIS — M54.2 CERVICALGIA: ICD-10-CM

## 2022-12-07 PROCEDURE — 99214 OFFICE O/P EST MOD 30 MIN: CPT

## 2022-12-07 NOTE — PHYSICAL EXAM
[de-identified] : Cervical Physical Exam\par \par Gait - Normal\par \par Station - Normal\par \par Sagittal balance - Normal \par \par Compensatory mechanism? - None\par \par Horizontal gaze - Maintained\par \par Heel walk - Normal\par \par Toe walk - Normal\par \par Reflexes\par Biceps - Normal\par Triceps - Normal\par Brachioradialis - Normal\par Patellar - Normal\par Gastroc - Normal\par Clonus -No\par \par Alcaraz´s - None\par \par Shoulder Exam -left posterior shoulder pain, left sided winging of scapula with forward elevation\par \par Spurling´s - None\par \par Wrist Pulses -2+ radial/ulnar\par \par Foot Pulses -2+ DP/PT\par \par Cervical range of motion - Normal\par \par Sensation \par C5-T1 sensation intact to light touch bilaterally, diffuse numbness over the left C6 and 7 and C8 distributions\par \par L1-S1 sensation intact to light touch bilaterally\par \par Motor\par \par \par 	Deltoid	Biceps	Triceps	WF	WE	IO	\par Right	5/5	5/5	5/5	5/5	5/5	5/5	5/5\par Left	4/5	4/5	3+/5	5/5	5/5	5/5	5/5\par \par \par 	IP	Quad	HS	TA	Gastroc	EHL\par Right	5/5	5/5	5/5	5/5	5/5	5/5\par Left	5/5	5/5	5/5	5/5	5/5	5/5 [de-identified] : Cervical radiographs reviewed from earlier November\par No significant facet arthropathy\par Disc heights relatively well-maintained\par \par Cervical MRI\par No areas of critical central or foraminal stenosis

## 2022-12-07 NOTE — HISTORY OF PRESENT ILLNESS
[de-identified] : Today the patient states that she is still having some left-sided periscapular pain.  She does state that as compared to her initial visit she has had improvement in overall symptoms.  She denies any bowel bladder issues.  She denies any saddle anesthesia.  She denies any issues with hand dexterity or balance.  She was not able to tolerate the steroid Dosepak nor was she able to tolerate diclofenac which was prescribed.  She is here today to discuss next steps in her care.\par \par 11/21/22\par Patient is a 32-year-old female here today for evaluation treatment of her neck and arm symptoms.  She has been dealing with the symptoms for approximately 3 weeks.  She does state that she has pain which does radiate over her left posterior shoulder into her shoulder blade.  She also describes numbness and tingling down her left arm.  She denies any bowel bladder issues.  She denies any saddle anesthesia.  She does state that the symptoms have been bad enough that she has gone to the ER twice.  They worked her up for both respiratory and neurologic issues including a brain MRI which thankfully came back negative.

## 2022-12-08 ENCOUNTER — APPOINTMENT (OUTPATIENT)
Dept: INTERNAL MEDICINE | Facility: CLINIC | Age: 32
End: 2022-12-08

## 2022-12-08 VITALS
OXYGEN SATURATION: 99 % | DIASTOLIC BLOOD PRESSURE: 66 MMHG | RESPIRATION RATE: 16 BRPM | SYSTOLIC BLOOD PRESSURE: 120 MMHG | TEMPERATURE: 98.4 F | BODY MASS INDEX: 22.88 KG/M2 | HEART RATE: 84 BPM | WEIGHT: 134 LBS | HEIGHT: 64 IN

## 2022-12-08 DIAGNOSIS — F41.9 ANXIETY DISORDER, UNSPECIFIED: ICD-10-CM

## 2022-12-08 DIAGNOSIS — K58.9 IRRITABLE BOWEL SYNDROME W/OUT DIARRHEA: ICD-10-CM

## 2022-12-08 DIAGNOSIS — R20.0 ANESTHESIA OF SKIN: ICD-10-CM

## 2022-12-08 DIAGNOSIS — M54.9 DORSALGIA, UNSPECIFIED: ICD-10-CM

## 2022-12-08 DIAGNOSIS — R07.0 PAIN IN THROAT: ICD-10-CM

## 2022-12-08 DIAGNOSIS — R07.89 OTHER CHEST PAIN: ICD-10-CM

## 2022-12-08 DIAGNOSIS — R20.2 ANESTHESIA OF SKIN: ICD-10-CM

## 2022-12-08 PROCEDURE — 99214 OFFICE O/P EST MOD 30 MIN: CPT | Mod: 25

## 2022-12-08 RX ORDER — CYCLOBENZAPRINE HYDROCHLORIDE 5 MG/1
5 TABLET, FILM COATED ORAL
Qty: 20 | Refills: 0 | Status: DISCONTINUED | COMMUNITY
Start: 2022-11-08 | End: 2022-12-08

## 2022-12-08 RX ORDER — CHOLESTYRAMINE 4 G/9G
4 POWDER, FOR SUSPENSION ORAL
Qty: 30 | Refills: 0 | Status: DISCONTINUED | COMMUNITY
Start: 2022-06-01 | End: 2022-12-08

## 2022-12-08 RX ORDER — IBUPROFEN 600 MG/1
600 TABLET, FILM COATED ORAL
Qty: 90 | Refills: 0 | Status: DISCONTINUED | COMMUNITY
Start: 2022-11-10 | End: 2022-12-08

## 2022-12-08 RX ORDER — PREDNISONE 20 MG/1
20 TABLET ORAL
Qty: 10 | Refills: 0 | Status: DISCONTINUED | COMMUNITY
Start: 2022-11-08 | End: 2022-12-08

## 2022-12-08 RX ORDER — DICYCLOMINE HYDROCHLORIDE 20 MG/1
20 TABLET ORAL
Refills: 0 | Status: DISCONTINUED | COMMUNITY
Start: 2022-10-05 | End: 2022-12-08

## 2022-12-08 RX ORDER — METHYLPREDNISOLONE 4 MG/1
4 TABLET ORAL
Qty: 1 | Refills: 0 | Status: DISCONTINUED | COMMUNITY
Start: 2022-11-21 | End: 2022-12-08

## 2022-12-08 RX ORDER — BENZONATATE 100 MG/1
100 CAPSULE ORAL
Qty: 21 | Refills: 0 | Status: DISCONTINUED | COMMUNITY
Start: 2022-10-31 | End: 2022-12-08

## 2022-12-08 RX ORDER — DICLOFENAC SODIUM 50 MG/1
50 TABLET, DELAYED RELEASE ORAL
Qty: 28 | Refills: 0 | Status: DISCONTINUED | COMMUNITY
Start: 2022-11-21 | End: 2022-12-08

## 2022-12-09 ENCOUNTER — APPOINTMENT (OUTPATIENT)
Dept: NEUROLOGY | Facility: CLINIC | Age: 32
End: 2022-12-09

## 2022-12-09 VITALS
DIASTOLIC BLOOD PRESSURE: 72 MMHG | SYSTOLIC BLOOD PRESSURE: 111 MMHG | WEIGHT: 135 LBS | HEART RATE: 87 BPM | HEIGHT: 64 IN | BODY MASS INDEX: 23.05 KG/M2

## 2022-12-09 DIAGNOSIS — M54.12 RADICULOPATHY, CERVICAL REGION: ICD-10-CM

## 2022-12-09 PROCEDURE — 99204 OFFICE O/P NEW MOD 45 MIN: CPT

## 2022-12-09 NOTE — CONSULT LETTER
[Dear  ___] : Dear  [unfilled], [Consult Letter:] : I had the pleasure of evaluating your patient, [unfilled]. [Please see my note below.] : Please see my note below. [Consult Closing:] : Thank you very much for allowing me to participate in the care of this patient.  If you have any questions, please do not hesitate to contact me. [Sincerely,] : Sincerely, [FreeTextEntry2] : Dr. Peggy Wells

## 2022-12-09 NOTE — ASSESSMENT
[FreeTextEntry1] : Recurrent left cervical radiculopathy with referred pain to the medial scapula.  She will obtain EMG to better localize the nerve entrapment.  Physical therapy hopefully will help her.  She will return for follow-up in 6 to 8 weeks.

## 2022-12-09 NOTE — HISTORY OF PRESENT ILLNESS
[FreeTextEntry1] : 32-year-old woman noted onset of left medial scapular pain 1 month ago.  She subsequently noted some left upper limb tingling and numbness.  Became frightened and started hyperventilating and had a headache.  She went to [facility] emergency room where she had a CT scan and MRI of the head and was told both studies were normal.  Subsequently she had recurrence of sharp burning pain in her left upper limb and left side of her neck that would at times radiate up to the left face.  2 weeks ago she was started on amitriptyline 10 mg at bedtime which has provided some benefit.\par \par She saw spine surgeon, Dr. Givens who obtained MRI of the cervical spine which was nondiagnostic.  He referred her for physical therapy which she has yet to start. \par \par  She has a history of irritable bowel syndrome.\par \par She is .  Works as a dental hygienist.

## 2022-12-10 LAB
ALBUMIN SERPL ELPH-MCNC: 5.1 G/DL
ALP BLD-CCNC: 58 U/L
ALT SERPL-CCNC: 34 U/L
ANION GAP SERPL CALC-SCNC: 12 MMOL/L
AST SERPL-CCNC: 25 U/L
BASOPHILS # BLD AUTO: 0.05 K/UL
BASOPHILS NFR BLD AUTO: 0.5 %
BILIRUB SERPL-MCNC: 0.4 MG/DL
BUN SERPL-MCNC: 6 MG/DL
C TRACH RRNA SPEC QL NAA+PROBE: NOT DETECTED
CALCIUM SERPL-MCNC: 10.2 MG/DL
CHLORIDE SERPL-SCNC: 104 MMOL/L
CHOLEST SERPL-MCNC: 207 MG/DL
CO2 SERPL-SCNC: 24 MMOL/L
CREAT SERPL-MCNC: 0.82 MG/DL
EGFR: 97 ML/MIN/1.73M2
EOSINOPHIL # BLD AUTO: 0.03 K/UL
EOSINOPHIL NFR BLD AUTO: 0.3 %
ESTIMATED AVERAGE GLUCOSE: 108 MG/DL
GLUCOSE SERPL-MCNC: 95 MG/DL
HBA1C MFR BLD HPLC: 5.4 %
HBV CORE IGG+IGM SER QL: NONREACTIVE
HBV SURFACE AB SER QL: REACTIVE
HBV SURFACE AG SER QL: NONREACTIVE
HCT VFR BLD CALC: 38.8 %
HCV AB SER QL: NONREACTIVE
HCV S/CO RATIO: 0.09 S/CO
HDLC SERPL-MCNC: 58 MG/DL
HGB BLD-MCNC: 12.1 G/DL
HIV1+2 AB SPEC QL IA.RAPID: NONREACTIVE
IMM GRANULOCYTES NFR BLD AUTO: 0.3 %
LDLC SERPL CALC-MCNC: 129 MG/DL
LYMPHOCYTES # BLD AUTO: 1.3 K/UL
LYMPHOCYTES NFR BLD AUTO: 13.1 %
MAN DIFF?: NORMAL
MCHC RBC-ENTMCNC: 26 PG
MCHC RBC-ENTMCNC: 31.2 GM/DL
MCV RBC AUTO: 83.3 FL
MONOCYTES # BLD AUTO: 0.38 K/UL
MONOCYTES NFR BLD AUTO: 3.8 %
N GONORRHOEA RRNA SPEC QL NAA+PROBE: NOT DETECTED
NEUTROPHILS # BLD AUTO: 8.1 K/UL
NEUTROPHILS NFR BLD AUTO: 82 %
NONHDLC SERPL-MCNC: 149 MG/DL
PLATELET # BLD AUTO: 213 K/UL
POTASSIUM SERPL-SCNC: 4.3 MMOL/L
PROT SERPL-MCNC: 7.3 G/DL
RBC # BLD: 4.66 M/UL
RBC # FLD: 15.3 %
SODIUM SERPL-SCNC: 140 MMOL/L
SOURCE AMPLIFICATION: NORMAL
T PALLIDUM AB SER QL IA: NEGATIVE
TRIGL SERPL-MCNC: 101 MG/DL
TSH SERPL-ACNC: 1.19 UIU/ML
WBC # FLD AUTO: 9.89 K/UL

## 2022-12-11 PROBLEM — M54.9 UPPER BACK PAIN: Status: ACTIVE | Noted: 2022-11-08

## 2022-12-11 PROBLEM — R20.0 NUMBNESS AND TINGLING IN LEFT HAND: Status: ACTIVE | Noted: 2022-11-09

## 2022-12-11 PROBLEM — K58.9 IBS (IRRITABLE BOWEL SYNDROME): Status: ACTIVE | Noted: 2020-11-18

## 2022-12-11 PROBLEM — R07.89 ATYPICAL CHEST PAIN: Status: RESOLVED | Noted: 2021-03-16 | Resolved: 2021-04-21

## 2022-12-11 PROBLEM — F41.9 ANXIETY: Status: ACTIVE | Noted: 2022-11-21

## 2022-12-11 NOTE — PHYSICAL EXAM
[No Acute Distress] : no acute distress [Well-Appearing] : well-appearing [Normal Sclera/Conjunctiva] : normal sclera/conjunctiva [PERRL] : pupils equal round and reactive to light [EOMI] : extraocular movements intact [Normal Outer Ear/Nose] : the outer ears and nose were normal in appearance [Normal Nasal Mucosa] : the nasal mucosa was normal [No Lymphadenopathy] : no lymphadenopathy [Supple] : supple [No Respiratory Distress] : no respiratory distress  [No Accessory Muscle Use] : no accessory muscle use [Clear to Auscultation] : lungs were clear to auscultation bilaterally [Normal Rate] : normal rate  [Regular Rhythm] : with a regular rhythm [Normal S1, S2] : normal S1 and S2 [No Murmur] : no murmur heard [Pedal Pulses Present] : the pedal pulses are present [No Edema] : there was no peripheral edema [Soft] : abdomen soft [Non Tender] : non-tender [No HSM] : no HSM [Normal Supraclavicular Nodes] : no supraclavicular lymphadenopathy [Normal Posterior Cervical Nodes] : no posterior cervical lymphadenopathy [Normal Anterior Cervical Nodes] : no anterior cervical lymphadenopathy [No CVA Tenderness] : no CVA  tenderness [No Spinal Tenderness] : no spinal tenderness [No Joint Swelling] : no joint swelling [Grossly Normal Strength/Tone] : grossly normal strength/tone [No Rash] : no rash [No Focal Deficits] : no focal deficits [Normal Gait] : normal gait [Deep Tendon Reflexes (DTR)] : deep tendon reflexes were 2+ and symmetric [Normal Affect] : the affect was normal [Alert and Oriented x3] : oriented to person, place, and time [Normal Oropharynx] : the oropharynx was normal [de-identified] : no pharyngeal exudate or erythema; no sinus tenderness [de-identified] : left arm: FROM w/o pain; left upper back: minimal kyle scapular tenderness, no rash or lesions [de-identified] : motor 5/5 b/l UEs, normal  b/l

## 2022-12-11 NOTE — HISTORY OF PRESENT ILLNESS
[FreeTextEntry1] : \par f/u [de-identified] : \par 31 yo F pmhx seasonal allergies, HLD, GERD, IBS, s/p ER visits 11/22 with left arm numbness a/w sob and lightheadedness here for f/u\par \par \par Last seen 11/21/22 for f/u.\par \par Fasting for labs.\par \par Needs letter of her medical conditions as just recently jury duty notice and wants to postpone.\par \par hx cough, hx transient sob/GOMEZ- reports sob resolved, cough minimal - mainly dry cough at night while laying, +reflux also felt at same time\par -hx pulm eval with sample Trelegy inhaler given, stopped due to SE or worsening anxiety\par -no pumps used currently\par \par Denies CP or sob.\par \par States spoke with GI after last visit re: GERD and throat discomfort/dysphagia- advised trial medrol over diclofenac; advised to start sucralfate, re-restart amitriptyline and continue pantoprazole qd and famotidine qhs.  Has f/u with GI today.\par -states has started reflux meds and feels reflux sx's are unchanged, but helps IBS sx's, arm pain and anxiety also improved. \par \par States took medrol x 2d then stopped as made her anxiety worse- stopped and sx's then improved.  \par Also tried diclofenac qd- states took x 1 week - finished last week felt fatigued with use, no GI sx's exacerbated.  Has since stopped and does not plan to resume.\par \par States was called by [facility] since last visit- told +strep test from prior visit and Augmentin course given - finished 1 week ago\par \par Feels throat discomfort, globus- only slightly better today overall.\par admits has been eating tomato based food on/off, 2d ago felt beef from stew she was eating got stock in throat. \par \par Has appt with [facility] 12/28/22 for eval of throat sx's, needs referral placed.\par \par +nausea on/off, no vomiting\par Has nl BMs, denies abdominal pain.\par \par hx left arm numbness, left upper back pain- getting less, ~ 40 %\par -followed by ortho, last seen 12/7 - noted intolerant on diclofenac and medrol garry. Hx c-spine MRI- told not concerning findings. Noted sx's overall improving.  Advised PT, OTC pain meds and f/u pending 1/23.\par -doing PT, x 2 sessions so far\par -no longer taking Flexeril since restarted amitriptyline\par -using Tylenol 650, 1-2x/d and using OTC pain patch daily with help\par -has neuro appt tomorrow re: EMG\par -planned to RTW 1/23 per her boss\par \par anxiety, depression-  2/2 health issues, denies HI or SI.  Feels is getting better.\par -following with in office , felt has anxiety and depression that reports is improving on restarting amitriptyline for IBS per GI 11/26/22.  Last seen 11/30/22, to f/u in 2 weeks.\par

## 2022-12-11 NOTE — REVIEW OF SYSTEMS
[Negative] : Cardiovascular [FreeTextEntry4] : see HPI [FreeTextEntry6] : see HPI [FreeTextEntry7] : see HPI [FreeTextEntry9] : see HPI [de-identified] : see HPI [de-identified] : see HPI

## 2022-12-11 NOTE — ASSESSMENT
[FreeTextEntry1] : \par 31 yo F pmhx seasonal allergies, HLD, GERD, IBS, s/p ER visits 11/22 with left arm numbness a/w sob and lightheadedness here for f/u\par \par \par hx cough, hx transient sob/GOMEZ- s/p ER visits 11/22 with negative w/u.  Resolved\par -s/p UC 10/3 with dx acute sinusitis s/p Amoxicillin course with subsequent cough onset \par -s/p repeat UC visit 10/27 dx'd bronchitis with MDI/Tessalon/Prednisone given and slowly improving sx's since.  -\par -11/22 (ER) d-dimer 0.22\par -11/22 (ER) cxr: no evidence of active pulmonary disease\par -11/22 (ER) cxr: no evidence of active pulmonary disease\par -11/22 (ER) cbc wnl, except wbc 13; cmp wnl, except K hi (+hemolysis), AST 72 ALT 44\par -11/22 cbc wnl, except wbc 11.8 \par -11/22 cmp wnl\par -11/22 (ER) cbc wnl, except Hg 11.7 (+menses); bmp wnl\par -seen by pulm 11/22 with PFTs done, given prednisone course (states never took) and sample inhaler Trelegy which took x1 then stopped as made her tremulous.  F/U pending\par -on Flonase and Tessalon prn\par -advised prompt medical eval if sx's worsen or new sx's arise\par \par left arm numbness, left upper back pain- hx left hand trauma 1 week prior to onset, suspect a/w strain related to awkward position held at work when back pain onset first noted; Improving\par -hx Flexeril, off since restarted amitriptyline due to concurrent SE risk\par -on Tylenol prn, using OTC pain patches prn; advised to avoid NSADs due to GI sx's\par -seen by hand ortho 11/22 thought cervical radiculopathy vs CTS.  Referred to see ortho, Dr. Rodriguez.  EMG ordered.\par -seen by Dr. Rodriguez 11/22, with xray c-spine done, noted mild scoliosis and straightening.  Advised OTC NSAIDs, heat and f/u in 3 weeks- does not plan to return to office.\par -followed by ortho, Dr. Givens (2nd opinion), last seen 12/7 - noted intolerant on diclofenac and medrol garry. Hx c-spine MRI- told not concerning findings. Noted sx's overall improving.  Advised PT, OTC pain meds and f/u pending 1/23.\par -doing PT\par -awaiting neurology appt 12/9/22\par -planned to RTW 1/23 per her boss\par \par IBS, GERD- recent reflux a/w throat discomfort and dysphagia on/off concurrent with NSAID use.  hx +step infection dx'd at recent ER visit- is s/p Augmentin course completion 1 week ago\par -followed by GI, on med regimen - has started sucralfate, re-restart amitriptyline and continued pantoprazole qd and famotidine qhs. Has f/u today- to discuss possible EGD need\par -advised PPI BID, cont. famotidine qhs\par -advised to avoid NSAIDs/medrol \par -advised to avoid reflux aggravating foods and meals near bedtime\par -has appt with [facility] 12/28/22 for eval of throat sx's, referral placed as requested\par -advised prompt medical eval if sx's worsen or new sx's arise\par \par anxiety, depression- 2/2 on-going health issues, improving some\par -support provided\par -following with in office , felt has anxiety and depression that reported improving on restarting amitriptyline for IBS per GI 11/26/22.  Last seen 11/30/22, to f/u in 2 weeks.\par -relaxation techniques counseled\par \par MISC: pt letter for jury duty given as requested\par \par \par HCM\par -check screening labs; agreeable to HIV/STD screening\par -hx CPE 11/21, yearly advised\par -defers flu shot to nv\par \par \par Pt's cell: [phone]\par \par Labs drawn in office today.\par

## 2022-12-12 ENCOUNTER — NON-APPOINTMENT (OUTPATIENT)
Age: 32
End: 2022-12-12

## 2022-12-13 ENCOUNTER — TRANSCRIPTION ENCOUNTER (OUTPATIENT)
Age: 32
End: 2022-12-13

## 2022-12-15 ENCOUNTER — APPOINTMENT (OUTPATIENT)
Dept: OTOLARYNGOLOGY | Facility: CLINIC | Age: 32
End: 2022-12-15

## 2022-12-15 ENCOUNTER — TRANSCRIPTION ENCOUNTER (OUTPATIENT)
Age: 32
End: 2022-12-15

## 2022-12-15 VITALS
HEIGHT: 64 IN | BODY MASS INDEX: 22.88 KG/M2 | SYSTOLIC BLOOD PRESSURE: 110 MMHG | WEIGHT: 134 LBS | HEART RATE: 98 BPM | DIASTOLIC BLOOD PRESSURE: 67 MMHG

## 2022-12-15 DIAGNOSIS — R13.10 DYSPHAGIA, UNSPECIFIED: ICD-10-CM

## 2022-12-15 DIAGNOSIS — K21.9 GASTRO-ESOPHAGEAL REFLUX DISEASE W/OUT ESOPHAGITIS: ICD-10-CM

## 2022-12-15 PROCEDURE — 31575 DIAGNOSTIC LARYNGOSCOPY: CPT

## 2022-12-15 PROCEDURE — 99204 OFFICE O/P NEW MOD 45 MIN: CPT | Mod: 25

## 2022-12-15 RX ORDER — FAMOTIDINE 40 MG/1
40 TABLET, FILM COATED ORAL DAILY
Refills: 0 | Status: COMPLETED | COMMUNITY
End: 2022-12-15

## 2022-12-15 RX ORDER — FLUTICASONE PROPIONATE 50 UG/1
50 SPRAY, METERED NASAL
Refills: 0 | Status: COMPLETED | COMMUNITY
End: 2022-12-15

## 2022-12-15 RX ORDER — ALBUTEROL SULFATE 90 UG/1
108 (90 BASE) INHALANT RESPIRATORY (INHALATION)
Qty: 7 | Refills: 0 | Status: COMPLETED | COMMUNITY
Start: 2022-10-27 | End: 2022-12-15

## 2022-12-15 NOTE — CONSULT LETTER
[Dear  ___] : Dear  [unfilled], [Consult Letter:] : I had the pleasure of evaluating your patient, [unfilled]. [Please see my note below.] : Please see my note below. [Consult Closing:] : Thank you very much for allowing me to participate in the care of this patient.  If you have any questions, please do not hesitate to contact me. [Sincerely,] : Sincerely, [FreeTextEntry2] : Dr Wells [FreeTextEntry3] : \par Serafin Remy MD, FACS\par \par Otolaryngology-Head and Neck Surgery\par [employer] [facility]\par

## 2022-12-15 NOTE — HISTORY OF PRESENT ILLNESS
[de-identified] : 32 yro pt referred by Dr. Wells for eval of dysphagia. \par Reports bilateral neck and left shoulder work injury one month ago \par Has been to hospital multiple times since then for anxiety attacks related to injury. Was taking large ibuprofen pills prescribed by orthopedics for the pain for about 1 week,  and then started to have dysphagia. \par Pt reports dysphagia and odynophagia for the last 3-4 weeks with saliva, solid food, and liquids. Feels like food gets stuck in throat. Increased phlegm and throat clearing. Reports lately voice has been getting tired when speaking for a long time. Occasional hoarseness. \par h/o acid reflux, taking pantoprazole and Sucralfate. \par Denies PND, complete loss of voice, choking, fever, chills. Weight loss of 10 pounds in the last month. \par Denies smoking or alcohol use. \par MRI cervical spine 11/23/2022.\par

## 2022-12-16 ENCOUNTER — TRANSCRIPTION ENCOUNTER (OUTPATIENT)
Age: 32
End: 2022-12-16

## 2022-12-20 ENCOUNTER — APPOINTMENT (OUTPATIENT)
Dept: INTERNAL MEDICINE | Facility: CLINIC | Age: 32
End: 2022-12-20

## 2022-12-22 ENCOUNTER — OUTPATIENT (OUTPATIENT)
Dept: OUTPATIENT SERVICES | Facility: HOSPITAL | Age: 32
LOS: 1 days | End: 2022-12-22
Payer: MEDICAID

## 2022-12-22 ENCOUNTER — APPOINTMENT (OUTPATIENT)
Dept: MRI IMAGING | Facility: IMAGING CENTER | Age: 32
End: 2022-12-22

## 2022-12-22 DIAGNOSIS — M54.9 DORSALGIA, UNSPECIFIED: ICD-10-CM

## 2022-12-22 PROCEDURE — 72146 MRI CHEST SPINE W/O DYE: CPT | Mod: 26

## 2022-12-22 PROCEDURE — 72146 MRI CHEST SPINE W/O DYE: CPT

## 2022-12-27 ENCOUNTER — TRANSCRIPTION ENCOUNTER (OUTPATIENT)
Age: 32
End: 2022-12-27

## 2022-12-28 ENCOUNTER — APPOINTMENT (OUTPATIENT)
Dept: OTOLARYNGOLOGY | Facility: CLINIC | Age: 32
End: 2022-12-28

## 2022-12-29 ENCOUNTER — APPOINTMENT (OUTPATIENT)
Dept: INTERNAL MEDICINE | Facility: CLINIC | Age: 32
End: 2022-12-29

## 2022-12-29 ENCOUNTER — TRANSCRIPTION ENCOUNTER (OUTPATIENT)
Age: 32
End: 2022-12-29

## 2022-12-29 ENCOUNTER — NON-APPOINTMENT (OUTPATIENT)
Age: 32
End: 2022-12-29

## 2022-12-29 VITALS
RESPIRATION RATE: 16 BRPM | BODY MASS INDEX: 23.22 KG/M2 | OXYGEN SATURATION: 96 % | DIASTOLIC BLOOD PRESSURE: 68 MMHG | HEART RATE: 91 BPM | SYSTOLIC BLOOD PRESSURE: 126 MMHG | HEIGHT: 64 IN | TEMPERATURE: 97.9 F | WEIGHT: 136 LBS

## 2022-12-29 DIAGNOSIS — R42 DIZZINESS AND GIDDINESS: ICD-10-CM

## 2022-12-29 DIAGNOSIS — R51.9 HEADACHE, UNSPECIFIED: ICD-10-CM

## 2022-12-29 DIAGNOSIS — R00.2 PALPITATIONS: ICD-10-CM

## 2022-12-29 PROCEDURE — 93000 ELECTROCARDIOGRAM COMPLETE: CPT | Mod: 59

## 2022-12-29 PROCEDURE — 99214 OFFICE O/P EST MOD 30 MIN: CPT | Mod: 25

## 2022-12-29 RX ORDER — UBIDECARENONE/VIT E ACET 100MG-5
50 MCG CAPSULE ORAL
Refills: 0 | Status: DISCONTINUED | COMMUNITY
End: 2022-12-29

## 2022-12-29 NOTE — ASSESSMENT
[FreeTextEntry1] : \par 31 yo F pmhx seasonal allergies, HLD, GERD, IBS, s/p ER visits 11/22 with left arm numbness a/w sob and lightheadedness here for f/u acute visit\par \par lightheadedness, imbalance feeling, blurry vision, palpitations, CP on/off- unclear etiology, though possibly 2/2 amitriptyline SE +/- GERD +/- anxiety.  VSS, negative orthostatics,nonfocal neuro exam\par -declines ER\par -EKG: NSR @ 74, no acute pathology\par -check CT head STAT\par -check labs\par -willing to taper off amitriptyline and monitor sx's, advised to also f/u with GI as was started for IBS.  Advised to consider restart of PPI for optimal reflux control as may be contributing to sx's.\par -cardio referral for eval \par -neuro f/u advised\par -optho f/u advised (hx nl exam summer 2022)\par -BH f/u, awaiting psychiatrist input re: med mgmnt\par -f/u 1 week\par -advised ER if sx's worsen\par \par Labs drawn in office today.\par

## 2022-12-29 NOTE — PHYSICAL EXAM
[No Acute Distress] : no acute distress [Well-Appearing] : well-appearing [Normal Sclera/Conjunctiva] : normal sclera/conjunctiva [PERRL] : pupils equal round and reactive to light [EOMI] : extraocular movements intact [Normal Outer Ear/Nose] : the outer ears and nose were normal in appearance [Normal Oropharynx] : the oropharynx was normal [Normal Nasal Mucosa] : the nasal mucosa was normal [No Lymphadenopathy] : no lymphadenopathy [Supple] : supple [Thyroid Normal, No Nodules] : the thyroid was normal and there were no nodules present [No Respiratory Distress] : no respiratory distress  [Clear to Auscultation] : lungs were clear to auscultation bilaterally [Normal Rate] : normal rate  [Regular Rhythm] : with a regular rhythm [Normal S1, S2] : normal S1 and S2 [No Murmur] : no murmur heard [Pedal Pulses Present] : the pedal pulses are present [No Edema] : there was no peripheral edema [Soft] : abdomen soft [Non Tender] : non-tender [No HSM] : no HSM [Normal Supraclavicular Nodes] : no supraclavicular lymphadenopathy [Normal Posterior Cervical Nodes] : no posterior cervical lymphadenopathy [Normal Anterior Cervical Nodes] : no anterior cervical lymphadenopathy [No CVA Tenderness] : no CVA  tenderness [No Spinal Tenderness] : no spinal tenderness [No Joint Swelling] : no joint swelling [No Rash] : no rash [No Focal Deficits] : no focal deficits [Normal Gait] : normal gait [Deep Tendon Reflexes (DTR)] : deep tendon reflexes were 2+ and symmetric [Normal Affect] : the affect was normal [Alert and Oriented x3] : oriented to person, place, and time [de-identified] : no photophobia [de-identified] : negative romberg's, no tremor

## 2022-12-29 NOTE — REVIEW OF SYSTEMS
[Negative] : Genitourinary [FreeTextEntry3] : see HPI [FreeTextEntry5] : see HPI [FreeTextEntry7] : see HPI [de-identified] : see HPI [de-identified] : see HPI

## 2022-12-29 NOTE — HISTORY OF PRESENT ILLNESS
[FreeTextEntry8] : \par 33 yo F pmhx seasonal allergies, HLD, GERD, IBS, s/p ER visits 11/22 with left arm numbness a/w sob and lightheadedness here for f/u acute visit\par \par \par Last seen 12/8/22 for f/u.\par \par c/o lightheadedness \par \par hx onset x few weeks- feels more constant x 2 weeks, less with laying down\par no overt vertigo\par +off balance feeling x 2 weeks, denies falls\par +occasional pressure front of head and eyes\par occasional blurry vision\par Denies double vision or loss of vision, dysphagias, LOC or focal weakness.\par \par +CP on/off mid chest, sharp, no relation to breathing or movement, ? reflux relation - present x few weeks, more frequent in past week.\par +palpitations on/off, present x few weeks, more frequent in past few days.  Sometimes with the CP and anxiety level.\par No sob.\par Notes anxiety not as well controlled recently, was helping with restarting amitriptyline 10mg since 11/26/22- tried to increase dose to 20 mg for better control of back pain 1 week ago- did x 1 week, felt more anxious with use so came back down to 10 mg again (on this x 3d now)\par \par hx reflux- off pantoprazole and sucralfate x3d now as feels reflux is better, but still occ throat discomfort. Is swallowing better, but occasional pain in throat\par Reports nl appetite and good po intake.\par +nausea on/off - chronic, told reflux related by GI, told to try meds then consider EGD if no better- f/u pending.  \par Saw ENT for throat issue- told +reflux.  Has f/u 1/23.\par Denies n/v, abd pain, BMs nl\par Is avoiding reflux causing foods and NSAIDs.\par \par anxiety, depression-  2/2 health issues, denies HI or SI.  Feels is getting better.\par -following with in office , spoke this morning about sx's and anxiety- told would d/w psychiatrist and f/u re: atlternate meds to try.  Has f/u 1/3/23\par  [de-identified] : \par

## 2022-12-30 ENCOUNTER — TRANSCRIPTION ENCOUNTER (OUTPATIENT)
Age: 32
End: 2022-12-30

## 2022-12-30 LAB
ANION GAP SERPL CALC-SCNC: 11 MMOL/L
BASOPHILS # BLD AUTO: 0.05 K/UL
BASOPHILS NFR BLD AUTO: 0.7 %
BUN SERPL-MCNC: 8 MG/DL
CALCIUM SERPL-MCNC: 10.2 MG/DL
CHLORIDE SERPL-SCNC: 104 MMOL/L
CO2 SERPL-SCNC: 28 MMOL/L
CREAT SERPL-MCNC: 0.76 MG/DL
EGFR: 106 ML/MIN/1.73M2
EOSINOPHIL # BLD AUTO: 0.03 K/UL
EOSINOPHIL NFR BLD AUTO: 0.4 %
GLUCOSE SERPL-MCNC: 95 MG/DL
HCT VFR BLD CALC: 38.5 %
HGB BLD-MCNC: 12.3 G/DL
IMM GRANULOCYTES NFR BLD AUTO: 0.1 %
LYMPHOCYTES # BLD AUTO: 1.61 K/UL
LYMPHOCYTES NFR BLD AUTO: 22.8 %
MAN DIFF?: NORMAL
MCHC RBC-ENTMCNC: 25.9 PG
MCHC RBC-ENTMCNC: 31.9 GM/DL
MCV RBC AUTO: 81.1 FL
MONOCYTES # BLD AUTO: 0.4 K/UL
MONOCYTES NFR BLD AUTO: 5.7 %
NEUTROPHILS # BLD AUTO: 4.97 K/UL
NEUTROPHILS NFR BLD AUTO: 70.3 %
PLATELET # BLD AUTO: 225 K/UL
POTASSIUM SERPL-SCNC: 4.6 MMOL/L
RBC # BLD: 4.75 M/UL
RBC # FLD: 14 %
SODIUM SERPL-SCNC: 142 MMOL/L
TSH SERPL-ACNC: 0.66 UIU/ML
WBC # FLD AUTO: 7.07 K/UL

## 2022-12-30 RX ORDER — AMITRIPTYLINE HYDROCHLORIDE 10 MG/1
10 TABLET, FILM COATED ORAL
Qty: 30 | Refills: 0 | Status: DISCONTINUED | COMMUNITY
Start: 2022-11-25 | End: 2022-12-30

## 2023-01-04 ENCOUNTER — TRANSCRIPTION ENCOUNTER (OUTPATIENT)
Age: 33
End: 2023-01-04

## 2023-01-05 ENCOUNTER — NON-APPOINTMENT (OUTPATIENT)
Age: 33
End: 2023-01-05

## 2023-01-05 ENCOUNTER — TRANSCRIPTION ENCOUNTER (OUTPATIENT)
Age: 33
End: 2023-01-05

## 2023-01-09 ENCOUNTER — NON-APPOINTMENT (OUTPATIENT)
Age: 33
End: 2023-01-09

## 2023-01-09 ENCOUNTER — TRANSCRIPTION ENCOUNTER (OUTPATIENT)
Age: 33
End: 2023-01-09

## 2023-01-11 ENCOUNTER — TRANSCRIPTION ENCOUNTER (OUTPATIENT)
Age: 33
End: 2023-01-11

## 2023-01-12 ENCOUNTER — APPOINTMENT (OUTPATIENT)
Dept: PSYCHIATRY | Facility: TELEHEALTH | Age: 33
End: 2023-01-12
Payer: MEDICAID

## 2023-01-12 PROCEDURE — 90792 PSYCH DIAG EVAL W/MED SRVCS: CPT | Mod: 95

## 2023-01-12 RX ORDER — PANTOPRAZOLE 40 MG/1
40 TABLET, DELAYED RELEASE ORAL DAILY
Qty: 30 | Refills: 1 | Status: DISCONTINUED | COMMUNITY
Start: 2022-11-12 | End: 2023-01-12

## 2023-01-12 NOTE — REASON FOR VISIT
[Telehealth (audio & video) - Individual/Group] : This visit was provided via telehealth using real-time 2-way audio visual technology. [Verbal consent obtained from patient/other participant(s)] : Verbal consent for telehealth/telephonic services obtained from patient/other participant(s) [FreeTextEntry1] : Phill Wilder, [facility]

## 2023-01-12 NOTE — SOCIAL HISTORY
[FreeTextEntry1] : , been with  for 10 years. \romina Has 2 sisters and brother\par She is not on speaking terms with her father in [place] - they fell out of touch. 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 \par Not using contraception. LMP: approx 1/12/23. \romina Has not had sex in 2 months due to pain and anxiety, not actively trying for a baby right now.\par No access to firearms

## 2023-01-12 NOTE — PLAN
[FreeTextEntry4] : This is a 31yo  woman, with psychiatric history of subclinical anxiety, no previous treatment, with PMH cervical radiculopathy, IBS, GERD, presenting for evaluation and management of anxiety.\par \par Reviewed recent notes from PMD and neurologist as well as recent bloodwork including TSH, recent radiology reports.\par \par Clinical impression is of panic disorder with agoraphobia. Given that this is a new problem, I concur with ongoing medical workup to rule out additional causes and attempting adjunctive treatments for new problem of back pain (ie PT). She has experienced very mild improvement with zoloft, but will likely require higher doses to achieve effect, in addition to continued therapy focused on panic aborting skills. She has some side effects to zoloft including bruxism. Discussed alternative/augmentation strategies, but pt wants to continue with zoloft monotherapy for now. We discussed treatment options and collaboratively decided on the following recommendations:\par \par Recommendations\par - increase zoloft to 75mg daily x 1 week, then 100 mg daily thereafter\par - agree with discontinuing amytriptaline\par - reassess in 4 weeks\par - continue medical workup\par - continue short term therapy\par - I provided the patient with psychoeducation on panic attacks and provided education (which we practiced in real time on a panic attack she had in session) on grounding skills (water on face, using ice, naming objects in room, counting). Pt responded well to these skills

## 2023-01-12 NOTE — HISTORY OF PRESENT ILLNESS
[FreeTextEntry1] : This is a 33yo  woman, with psychiatric history of subclinical anxiety, no previous treatment, with PMH cervical radiculopathy, IBS, GERD, presenting for evaluation and management of anxiety.\par \par Pt reports that she has no relevant psychiatric history until fall 2022 when s was driving, started to feel a sharp pain on her back and immediately felt lightheaded, pulled over, started hyperventilating, felt confused and disoriented. She was on her way to work and asked a colleague to take her to the hospital, reports a number of tests were done, and were nondiagnostic.\par \par Since then, reports frequent (daily or more) panic attacks, reports feeling she is going to "pass out," hyperventilation, difficulty sleeping (2-3 hours). Reports some panic attacks last about 10 minutes or longer. Reports attacks are associated with lightheadedness, tachycardia. Sometimes attacks are triggered by back pain and sometimes are random or occur during relaxing activities (like watching TV). Feels tired, mind is racing. Feeling like she is avoiding situations that give her panic attacks (like going outside or going out to eat). Avoids public settings, too many. Reports she has always been an active person and this has changed. She has not been able to work due to these conditions. Patient becomes tearful, reports, "I've been really sad." Reports feeling frustrated that she has not gotten better at a good pace. Reports feeling hopeless, defeated. \par \par Pt recently re-started amytriptaline, recommended by her GI doctor for IBS. She discontinued amytriptaline and instead started zoloft a few weeks ago, just increased to 50mg 2 days ago. Side effects include bruxism, headaches. Endorses that the medication is helping her sleep from 2-3 hours, now she sleeps 5-6 hours. However, reports that overall her anxiety has gone down to just "9" from "10" out of 10 when she first started feeling this way.\par \par Reports feeling sad, crying, most of the day most days for the past few weeks. Not enjoying activities, not working. Sleep poor. Cannot concentrate - since December reports feeling lightheaded unfocused daily. Tired all the time. Appetite OK. Denies feelings of guilt. \par \par For her back pain, she met with an orthopedist who recommended PT and a neurologist who diagnosed cervical radiculopathy. Her PMD is completing a workup for lightheadedness/chest pain.\par \par Denies suicidal ideation, intent or plan.  Denies violent ideation, intent or plan. Denies symptoms or history of psychosis. Denies symptoms or history of melvin and hypomania. Denies substance use. Denies obsessions or compulsions. Denies trauma history. Denies history of eating disorder.    [FreeTextEntry2] : Reports history of persistently low mood during the pandemic. \par No history of therapy or psychiatry.  [FreeTextEntry3] : amitriptyline for IBS

## 2023-01-12 NOTE — PHYSICAL EXAM
[Cooperative] : cooperative [Depressed] : depressed [Clear] : clear [Linear/Goal Directed] : linear/goal directed [Average] : average [WNL] : within normal limits [de-identified] : dysphoric, tearful. Patient actively has a panic attack during the session, which responds to behavioral interventions

## 2023-01-12 NOTE — DISCUSSION/SUMMARY
[FreeTextEntry1] : - Modifiable risk factors: ongoing anxiety, physical complaints\par \par - Unmodifiable risk factors: history of anxiety\par \par - Protective factors: seeking care, therapeutic relationships with doctors, resilient, domiciled, good family support, , no suicidal intent/plan, no history of suicide attempt, no violent intent/plan, able to engage in safety planning, no known access to firearms, sober, medically stable, future oriented.\par \par - Protective factors mitigate risk and the patient is clinically appropriate for outpatient care. Modifiable risk factors are being addressed as below.

## 2023-01-13 ENCOUNTER — TRANSCRIPTION ENCOUNTER (OUTPATIENT)
Age: 33
End: 2023-01-13

## 2023-01-13 ENCOUNTER — NON-APPOINTMENT (OUTPATIENT)
Age: 33
End: 2023-01-13

## 2023-01-14 ENCOUNTER — APPOINTMENT (OUTPATIENT)
Dept: CT IMAGING | Facility: IMAGING CENTER | Age: 33
End: 2023-01-14
Payer: MEDICAID

## 2023-01-14 ENCOUNTER — OUTPATIENT (OUTPATIENT)
Dept: OUTPATIENT SERVICES | Facility: HOSPITAL | Age: 33
LOS: 1 days | End: 2023-01-14
Payer: MEDICAID

## 2023-01-14 DIAGNOSIS — R42 DIZZINESS AND GIDDINESS: ICD-10-CM

## 2023-01-14 PROCEDURE — 70450 CT HEAD/BRAIN W/O DYE: CPT | Mod: 26

## 2023-01-14 PROCEDURE — 70450 CT HEAD/BRAIN W/O DYE: CPT

## 2023-01-16 RX ORDER — PANTOPRAZOLE SODIUM 40 MG/1
40 TABLET, DELAYED RELEASE ORAL
Refills: 0 | Status: ACTIVE | COMMUNITY

## 2023-01-16 RX ORDER — PROPRANOLOL HYDROCHLORIDE 10 MG/1
10 TABLET ORAL
Qty: 30 | Refills: 1 | Status: DISCONTINUED | COMMUNITY
Start: 2023-01-13 | End: 2023-01-16

## 2023-01-16 RX ORDER — SUCRALFATE 1 G/10ML
1 SUSPENSION ORAL
Qty: 300 | Refills: 0 | Status: DISCONTINUED | COMMUNITY
Start: 2022-05-26 | End: 2023-01-16

## 2023-01-17 ENCOUNTER — NON-APPOINTMENT (OUTPATIENT)
Age: 33
End: 2023-01-17

## 2023-01-17 ENCOUNTER — TRANSCRIPTION ENCOUNTER (OUTPATIENT)
Age: 33
End: 2023-01-17

## 2023-01-20 ENCOUNTER — APPOINTMENT (OUTPATIENT)
Dept: INTERNAL MEDICINE | Facility: CLINIC | Age: 33
End: 2023-01-20

## 2023-01-23 ENCOUNTER — NON-APPOINTMENT (OUTPATIENT)
Age: 33
End: 2023-01-23

## 2023-01-24 ENCOUNTER — APPOINTMENT (OUTPATIENT)
Dept: OTOLARYNGOLOGY | Facility: CLINIC | Age: 33
End: 2023-01-24

## 2023-01-26 ENCOUNTER — TRANSCRIPTION ENCOUNTER (OUTPATIENT)
Age: 33
End: 2023-01-26

## 2023-01-26 ENCOUNTER — NON-APPOINTMENT (OUTPATIENT)
Age: 33
End: 2023-01-26

## 2023-01-26 RX ORDER — SERTRALINE 25 MG/1
25 TABLET, FILM COATED ORAL DAILY
Qty: 30 | Refills: 0 | Status: DISCONTINUED | COMMUNITY
Start: 2022-12-30 | End: 2023-01-26

## 2023-02-01 ENCOUNTER — APPOINTMENT (OUTPATIENT)
Dept: ORTHOPEDIC SURGERY | Facility: CLINIC | Age: 33
End: 2023-02-01

## 2023-02-04 PROBLEM — J45.909 AIRWAY HYPERREACTIVITY: Status: ACTIVE | Noted: 2023-02-04

## 2023-02-04 PROBLEM — R05.9 COUGH: Status: ACTIVE | Noted: 2022-10-31

## 2023-02-04 PROBLEM — K21.9 GERD (GASTROESOPHAGEAL REFLUX DISEASE): Status: ACTIVE | Noted: 2021-11-20

## 2023-02-04 NOTE — DISCUSSION/SUMMARY
[FreeTextEntry1] : 33 yo female with mild OAD related complaints. I reviewed the PFT results with the patient. Prednisone 40 mg daily for five days prescribed. She was given a two week sample of trelegy 200 with PRN albuterol MDI use. Treatment adjustment will depend on symptomatic needs. She is to follow up with her PMD as before.

## 2023-02-04 NOTE — HISTORY OF PRESENT ILLNESS
[Never] : never [TextBox_4] : 31 yo female presents for evaluation of dry cough for one month. The patient initially complained of sinus infection, treated with antibiotics. She was subsequently treated with albuterol MDI for the first time, after walk in clinic evaluation.Last week she was evaluated  for "near syncope" with negative work up.She has hx of GERD with IBS with H2 blocker use PRN. [TextBox_29] : Denies snoring, daytime somnolence, apneic episodes, AM headaches

## 2023-02-04 NOTE — REVIEW OF SYSTEMS
[Cough] : cough [Sputum] : no sputum [GERD] : gerd [Abdominal Pain] : abdominal pain [Negative] : Endocrine

## 2023-02-10 ENCOUNTER — TRANSCRIPTION ENCOUNTER (OUTPATIENT)
Age: 33
End: 2023-02-10

## 2023-02-16 ENCOUNTER — TRANSCRIPTION ENCOUNTER (OUTPATIENT)
Age: 33
End: 2023-02-16

## 2023-02-21 ENCOUNTER — TRANSCRIPTION ENCOUNTER (OUTPATIENT)
Age: 33
End: 2023-02-21

## 2023-02-22 ENCOUNTER — TRANSCRIPTION ENCOUNTER (OUTPATIENT)
Age: 33
End: 2023-02-22

## 2023-02-24 ENCOUNTER — TRANSCRIPTION ENCOUNTER (OUTPATIENT)
Age: 33
End: 2023-02-24

## 2023-02-27 ENCOUNTER — TRANSCRIPTION ENCOUNTER (OUTPATIENT)
Age: 33
End: 2023-02-27

## 2023-03-09 ENCOUNTER — TRANSCRIPTION ENCOUNTER (OUTPATIENT)
Age: 33
End: 2023-03-09

## 2023-03-15 ENCOUNTER — APPOINTMENT (OUTPATIENT)
Dept: NEUROLOGY | Facility: CLINIC | Age: 33
End: 2023-03-15

## 2023-03-21 ENCOUNTER — APPOINTMENT (OUTPATIENT)
Dept: INTERNAL MEDICINE | Facility: CLINIC | Age: 33
End: 2023-03-21

## 2023-03-29 ENCOUNTER — TRANSCRIPTION ENCOUNTER (OUTPATIENT)
Age: 33
End: 2023-03-29

## 2023-03-29 RX ORDER — SERTRALINE HYDROCHLORIDE 50 MG/1
50 TABLET, FILM COATED ORAL DAILY
Qty: 30 | Refills: 1 | Status: ACTIVE | COMMUNITY
Start: 2023-01-13 | End: 1900-01-01

## 2023-03-29 RX ORDER — HYDROXYZINE HYDROCHLORIDE 25 MG/1
25 TABLET ORAL
Qty: 30 | Refills: 1 | Status: ACTIVE | COMMUNITY
Start: 2023-01-17 | End: 1900-01-01

## 2023-03-29 RX ORDER — SERTRALINE HYDROCHLORIDE 100 MG/1
100 TABLET, FILM COATED ORAL DAILY
Qty: 30 | Refills: 1 | Status: ACTIVE | COMMUNITY
Start: 2023-01-26 | End: 1900-01-01

## 2023-04-03 ENCOUNTER — APPOINTMENT (OUTPATIENT)
Dept: INTERNAL MEDICINE | Facility: CLINIC | Age: 33
End: 2023-04-03
Payer: MEDICAID

## 2023-04-03 VITALS
HEART RATE: 77 BPM | SYSTOLIC BLOOD PRESSURE: 118 MMHG | TEMPERATURE: 97.3 F | HEIGHT: 64 IN | OXYGEN SATURATION: 97 % | WEIGHT: 135 LBS | BODY MASS INDEX: 23.05 KG/M2 | DIASTOLIC BLOOD PRESSURE: 76 MMHG

## 2023-04-03 DIAGNOSIS — F40.01 AGORAPHOBIA WITH PANIC DISORDER: ICD-10-CM

## 2023-04-03 DIAGNOSIS — Z00.00 ENCOUNTER FOR GENERAL ADULT MEDICAL EXAMINATION W/OUT ABNORMAL FINDINGS: ICD-10-CM

## 2023-04-03 PROCEDURE — 99395 PREV VISIT EST AGE 18-39: CPT | Mod: 25

## 2023-04-06 ENCOUNTER — TRANSCRIPTION ENCOUNTER (OUTPATIENT)
Age: 33
End: 2023-04-06

## 2023-04-06 ENCOUNTER — NON-APPOINTMENT (OUTPATIENT)
Age: 33
End: 2023-04-06

## 2023-04-19 ENCOUNTER — APPOINTMENT (OUTPATIENT)
Dept: CARDIOLOGY | Facility: CLINIC | Age: 33
End: 2023-04-19

## 2023-05-04 ENCOUNTER — APPOINTMENT (OUTPATIENT)
Dept: NEUROLOGY | Facility: CLINIC | Age: 33
End: 2023-05-04

## 2023-05-23 ENCOUNTER — APPOINTMENT (OUTPATIENT)
Dept: INTERNAL MEDICINE | Facility: CLINIC | Age: 33
End: 2023-05-23

## 2023-06-20 ENCOUNTER — NON-APPOINTMENT (OUTPATIENT)
Age: 33
End: 2023-06-20

## 2023-07-17 ENCOUNTER — APPOINTMENT (OUTPATIENT)
Dept: CARDIOLOGY | Facility: CLINIC | Age: 33
End: 2023-07-17

## 2023-07-17 ENCOUNTER — APPOINTMENT (OUTPATIENT)
Dept: INTERNAL MEDICINE | Facility: CLINIC | Age: 33
End: 2023-07-17

## 2023-07-31 ENCOUNTER — APPOINTMENT (OUTPATIENT)
Dept: OBGYN | Facility: CLINIC | Age: 33
End: 2023-07-31

## 2023-08-10 ENCOUNTER — APPOINTMENT (OUTPATIENT)
Dept: CARDIOLOGY | Facility: CLINIC | Age: 33
End: 2023-08-10

## 2023-08-18 ENCOUNTER — APPOINTMENT (OUTPATIENT)
Dept: CARDIOLOGY | Facility: CLINIC | Age: 33
End: 2023-08-18

## 2023-08-25 ENCOUNTER — APPOINTMENT (OUTPATIENT)
Dept: CARDIOLOGY | Facility: CLINIC | Age: 33
End: 2023-08-25

## 2023-09-21 ENCOUNTER — APPOINTMENT (OUTPATIENT)
Dept: CARDIOLOGY | Facility: CLINIC | Age: 33
End: 2023-09-21

## 2023-09-26 LAB
ALBUMIN SERPL ELPH-MCNC: 4.9 G/DL
ALP BLD-CCNC: 68 U/L
ALT SERPL-CCNC: 14 U/L
ANION GAP SERPL CALC-SCNC: 13 MMOL/L
AST SERPL-CCNC: 16 U/L
BASOPHILS # BLD AUTO: 0.05 K/UL
BASOPHILS NFR BLD AUTO: 0.7 %
BILIRUB SERPL-MCNC: <0.2 MG/DL
BUN SERPL-MCNC: 13 MG/DL
CALCIUM SERPL-MCNC: 9.9 MG/DL
CHLORIDE SERPL-SCNC: 101 MMOL/L
CHOLEST SERPL-MCNC: 218 MG/DL
CO2 SERPL-SCNC: 24 MMOL/L
CREAT SERPL-MCNC: 0.77 MG/DL
EGFR: 104 ML/MIN/1.73M2
EOSINOPHIL # BLD AUTO: 0.08 K/UL
EOSINOPHIL NFR BLD AUTO: 1.1 %
ESTIMATED AVERAGE GLUCOSE: 108 MG/DL
GLUCOSE SERPL-MCNC: 91 MG/DL
HBA1C MFR BLD HPLC: 5.4 %
HCT VFR BLD CALC: 39.4 %
HDLC SERPL-MCNC: 61 MG/DL
HGB BLD-MCNC: 12.1 G/DL
IMM GRANULOCYTES NFR BLD AUTO: 0.3 %
LDLC SERPL CALC-MCNC: 136 MG/DL
LYMPHOCYTES # BLD AUTO: 1.78 K/UL
LYMPHOCYTES NFR BLD AUTO: 24.4 %
MAN DIFF?: NORMAL
MCHC RBC-ENTMCNC: 25.1 PG
MCHC RBC-ENTMCNC: 30.7 GM/DL
MCV RBC AUTO: 81.6 FL
MONOCYTES # BLD AUTO: 0.43 K/UL
MONOCYTES NFR BLD AUTO: 5.9 %
NEUTROPHILS # BLD AUTO: 4.94 K/UL
NEUTROPHILS NFR BLD AUTO: 67.6 %
NONHDLC SERPL-MCNC: 157 MG/DL
PLATELET # BLD AUTO: 255 K/UL
POTASSIUM SERPL-SCNC: 4.5 MMOL/L
PROT SERPL-MCNC: 7.4 G/DL
RBC # BLD: 4.83 M/UL
RBC # FLD: 13.8 %
SODIUM SERPL-SCNC: 138 MMOL/L
T4 FREE SERPL-MCNC: 1.2 NG/DL
TRIGL SERPL-MCNC: 107 MG/DL
TSH SERPL-ACNC: 1.21 UIU/ML
VIT B12 SERPL-MCNC: 985 PG/ML
WBC # FLD AUTO: 7.3 K/UL

## 2023-10-17 ENCOUNTER — APPOINTMENT (OUTPATIENT)
Dept: PULMONOLOGY | Facility: CLINIC | Age: 33
End: 2023-10-17
Payer: COMMERCIAL

## 2023-10-17 VITALS
SYSTOLIC BLOOD PRESSURE: 116 MMHG | WEIGHT: 139 LBS | TEMPERATURE: 98 F | DIASTOLIC BLOOD PRESSURE: 70 MMHG | BODY MASS INDEX: 23.86 KG/M2 | OXYGEN SATURATION: 99 % | HEART RATE: 108 BPM

## 2023-10-17 PROCEDURE — 99214 OFFICE O/P EST MOD 30 MIN: CPT

## 2023-10-17 RX ORDER — ALBUTEROL SULFATE 90 UG/1
108 (90 BASE) INHALANT RESPIRATORY (INHALATION)
Qty: 1 | Refills: 3 | Status: ACTIVE | COMMUNITY
Start: 2023-10-17 | End: 1900-01-01

## 2023-11-07 ENCOUNTER — APPOINTMENT (OUTPATIENT)
Dept: PULMONOLOGY | Facility: CLINIC | Age: 33
End: 2023-11-07

## 2023-12-05 ENCOUNTER — APPOINTMENT (OUTPATIENT)
Dept: PULMONOLOGY | Facility: CLINIC | Age: 33
End: 2023-12-05
Payer: COMMERCIAL

## 2023-12-05 VITALS
SYSTOLIC BLOOD PRESSURE: 98 MMHG | DIASTOLIC BLOOD PRESSURE: 60 MMHG | HEART RATE: 78 BPM | TEMPERATURE: 97.7 F | WEIGHT: 140 LBS | OXYGEN SATURATION: 99 % | BODY MASS INDEX: 24.03 KG/M2

## 2023-12-05 DIAGNOSIS — G47.9 SLEEP DISORDER, UNSPECIFIED: ICD-10-CM

## 2023-12-05 PROCEDURE — 94727 GAS DIL/WSHOT DETER LNG VOL: CPT

## 2023-12-05 PROCEDURE — 99214 OFFICE O/P EST MOD 30 MIN: CPT | Mod: 25

## 2023-12-05 PROCEDURE — 94729 DIFFUSING CAPACITY: CPT

## 2023-12-05 PROCEDURE — 94060 EVALUATION OF WHEEZING: CPT

## 2024-01-25 ENCOUNTER — APPOINTMENT (OUTPATIENT)
Dept: ORTHOPEDIC SURGERY | Facility: CLINIC | Age: 34
End: 2024-01-25

## 2024-07-20 NOTE — HISTORY OF PRESENT ILLNESS
[FreeTextEntry6] : 34 years old female has past medical history of GERD and anxiety, came to office to establish care. Pt complained left rib pain.

## 2024-07-25 ENCOUNTER — APPOINTMENT (OUTPATIENT)
Dept: FAMILY MEDICINE | Facility: CLINIC | Age: 34
End: 2024-07-25

## 2024-09-07 ENCOUNTER — APPOINTMENT (OUTPATIENT)
Dept: INTERNAL MEDICINE | Facility: CLINIC | Age: 34
End: 2024-09-07